# Patient Record
Sex: MALE | Race: OTHER | Employment: OTHER | URBAN - METROPOLITAN AREA
[De-identification: names, ages, dates, MRNs, and addresses within clinical notes are randomized per-mention and may not be internally consistent; named-entity substitution may affect disease eponyms.]

---

## 2017-10-17 ENCOUNTER — OFFICE VISIT (OUTPATIENT)
Dept: FAMILY MEDICINE CLINIC | Facility: CLINIC | Age: 82
End: 2017-10-17

## 2017-10-17 VITALS
TEMPERATURE: 98 F | RESPIRATION RATE: 16 BRPM | SYSTOLIC BLOOD PRESSURE: 110 MMHG | DIASTOLIC BLOOD PRESSURE: 70 MMHG | HEIGHT: 63.5 IN | HEART RATE: 68 BPM | WEIGHT: 141 LBS | BODY MASS INDEX: 24.67 KG/M2

## 2017-10-17 DIAGNOSIS — Z95.0 PACEMAKER: ICD-10-CM

## 2017-10-17 DIAGNOSIS — E53.8 B12 DEFICIENCY: ICD-10-CM

## 2017-10-17 DIAGNOSIS — M15.9 OSTEOARTHRITIS OF MULTIPLE JOINTS, UNSPECIFIED OSTEOARTHRITIS TYPE: ICD-10-CM

## 2017-10-17 DIAGNOSIS — L85.3 DRY SKIN DERMATITIS: ICD-10-CM

## 2017-10-17 DIAGNOSIS — H26.9 CATARACT, UNSPECIFIED CATARACT TYPE, UNSPECIFIED LATERALITY: ICD-10-CM

## 2017-10-17 DIAGNOSIS — I10 ESSENTIAL HYPERTENSION: Primary | ICD-10-CM

## 2017-10-17 DIAGNOSIS — E78.01 FAMILIAL HYPERCHOLESTEROLEMIA: ICD-10-CM

## 2017-10-17 PROCEDURE — 90686 IIV4 VACC NO PRSV 0.5 ML IM: CPT | Performed by: INTERNAL MEDICINE

## 2017-10-17 PROCEDURE — 99214 OFFICE O/P EST MOD 30 MIN: CPT | Performed by: INTERNAL MEDICINE

## 2017-10-17 PROCEDURE — G0008 ADMIN INFLUENZA VIRUS VAC: HCPCS | Performed by: INTERNAL MEDICINE

## 2017-10-17 RX ORDER — ATORVASTATIN CALCIUM 40 MG/1
40 TABLET, FILM COATED ORAL NIGHTLY
COMMUNITY
End: 2018-07-17

## 2017-10-17 RX ORDER — NAPROXEN 375 MG/1
375 TABLET ORAL 2 TIMES DAILY WITH MEALS
COMMUNITY
End: 2018-02-05

## 2017-10-17 RX ORDER — ALFUZOSIN HYDROCHLORIDE 10 MG/1
10 TABLET, EXTENDED RELEASE ORAL DAILY
COMMUNITY
End: 2018-02-06

## 2017-10-17 RX ORDER — LOSARTAN POTASSIUM 100 MG/1
TABLET ORAL DAILY
COMMUNITY
End: 2017-11-30

## 2017-10-17 RX ORDER — HYDROXYZINE HYDROCHLORIDE 25 MG/1
25 TABLET, FILM COATED ORAL DAILY PRN
COMMUNITY

## 2017-10-17 NOTE — PROGRESS NOTES
CC: Patient presents with:  Establish Care: Former PCP was Dr. Cheryle Hocking in [place], [place]. Records Release obtained.   Imm/Inj: Flu vaccine today       HPI:      Here to establish   Over the past year     Here takes makes medication for hyperlipidemia     Ta Alcohol use:  No                  REVIEW OF SYSTEMS:   GENERAL: feels well otherwise, no fevers chills or night sweats   SKIN: denies any unusual skin lesions  EYES:denies blurred vision or double vision  HEENT: denies nasal congestion, sinus pain or ST (primary encounter diagnosis)  -stable controlled   -continue current management    B12 deficiency  -recheck b12 level    Familial hypercholesterolemia  -recheck lipid panel   -continue statin therapy     Osteoarthritis of multiple joints, unspecified oste

## 2017-10-23 ENCOUNTER — LAB ENCOUNTER (OUTPATIENT)
Dept: LAB | Age: 82
End: 2017-10-23
Attending: INTERNAL MEDICINE
Payer: COMMERCIAL

## 2017-10-23 DIAGNOSIS — E53.8 B12 DEFICIENCY: ICD-10-CM

## 2017-10-23 DIAGNOSIS — E78.01 FAMILIAL HYPERCHOLESTEROLEMIA: ICD-10-CM

## 2017-10-23 DIAGNOSIS — I10 ESSENTIAL HYPERTENSION: ICD-10-CM

## 2017-10-23 PROCEDURE — 80061 LIPID PANEL: CPT

## 2017-10-23 PROCEDURE — 36415 COLL VENOUS BLD VENIPUNCTURE: CPT

## 2017-10-23 PROCEDURE — 85025 COMPLETE CBC W/AUTO DIFF WBC: CPT

## 2017-10-23 PROCEDURE — 82607 VITAMIN B-12: CPT

## 2017-10-23 PROCEDURE — 80053 COMPREHEN METABOLIC PANEL: CPT

## 2017-11-07 ENCOUNTER — OFFICE VISIT (OUTPATIENT)
Dept: FAMILY MEDICINE CLINIC | Facility: CLINIC | Age: 82
End: 2017-11-07

## 2017-11-07 ENCOUNTER — TELEPHONE (OUTPATIENT)
Dept: FAMILY MEDICINE CLINIC | Facility: CLINIC | Age: 82
End: 2017-11-07

## 2017-11-07 VITALS
BODY MASS INDEX: 24.41 KG/M2 | HEART RATE: 78 BPM | HEIGHT: 64 IN | RESPIRATION RATE: 16 BRPM | WEIGHT: 143 LBS | TEMPERATURE: 98 F | OXYGEN SATURATION: 98 % | DIASTOLIC BLOOD PRESSURE: 80 MMHG | SYSTOLIC BLOOD PRESSURE: 124 MMHG

## 2017-11-07 DIAGNOSIS — J02.9 ACUTE PHARYNGITIS, UNSPECIFIED ETIOLOGY: Primary | ICD-10-CM

## 2017-11-07 PROCEDURE — 87081 CULTURE SCREEN ONLY: CPT | Performed by: PHYSICIAN ASSISTANT

## 2017-11-07 PROCEDURE — 87880 STREP A ASSAY W/OPTIC: CPT | Performed by: PHYSICIAN ASSISTANT

## 2017-11-07 PROCEDURE — 99213 OFFICE O/P EST LOW 20 MIN: CPT | Performed by: PHYSICIAN ASSISTANT

## 2017-11-07 NOTE — PATIENT INSTRUCTIONS
Pharyngitis (Sore Throat), Report Pending    Pharyngitis (sore throat) is often due to a virus. It can also be caused by the streptococcus, or strep, bacterium, often called strep throat.  Both viral and strep infections can cause throat pain that is wors · For children: Use acetaminophen for fever, fussiness, or discomfort.  In infants older than 10months of age, you may use ibuprofen instead of acetaminophen. Talk with your child's healthcare provider before giving these medicines if your child has chronic · Signs of dehydration (very dark urine or no urine, sunken eyes, dizziness)  · Trouble breathing or noisy breathing  · Muffled voice  · New rash  · Child appears to be getting sicker  Date Last Reviewed: 4/13/2015  © 1848-3518 [employer].  8 Prevent future sore throats  Prevention tips include the following:  · Stop smoking or reduce contact with secondhand smoke. Smoke irritates the tender throat lining. · Limit contact with pets and with allergy-causing substances, such as pollen and mold. The tonsils and pharynx can become inflamed due to a cold or flu virus. Postnasal drip (excess mucus draining from the nasal cavity) can irritate the throat. It can also make the throat or tonsils more likely to be infected by bacteria.  Severe, untreated t Treatment depends on many factors. What is the likely cause? Is the problem recent? Does it keep coming back? In many cases, the best thing to do is to treat the symptoms, rest, and let the problem heal itself.  Antibiotics may help clear up some bacterial In some cases, tonsils need to be removed. This is often done as outpatient (same-day) surgery. Your healthcare provider may advise removing the tonsils in cases of:  · Several severe bouts of tonsillitis in a year.  “Severe” episodes include those that billy

## 2017-11-07 NOTE — PROGRESS NOTES
CHIEF COMPLAINT:   Patient presents with:  Sore Throat    HPI:   Rocio Marroquin is a [de-identified]year old male presents to clinic with complaint of sore throat. Patient has had for 3 days.  Patient/daughter reports following associated symptoms: fever, no nasal c GENERAL: well developed, well nourished, in no apparent distress  SKIN: no rashes, no suspicious lesions  HEAD: atraumatic, normocephalic  EYES: conjunctiva clear, EOM intact  EARS: TM's clear, non-injected, no bulging, retraction, or fluid bilaterally  NO A test has been done to find out whether you (or your child, if your child is the patient) have strep throat. Call this facility or your healthcare provider if you were not given your test results.  If the test is positive for strep infection, you will need · Use throat lozenges or numbing throat sprays to help reduce pain. Gargling with warm salt water will also help reduce throat pain. For this, dissolve 1/2 teaspoon of salt in 1 glass of warm water.  To help soothe a sore throat, children can sip on juice o Sore throats happen for many reasons, such as colds, allergies, and infections caused by viruses or bacteria. In any case, your throat becomes red and sore.  Your goal for self-care is to reduce your discomfort while giving your throat a chance to heal.  Mo · A temperature over 101°F (38.3°C)  · White spots on the throat  · Great difficulty swallowing  · Trouble breathing  · A skin rash  · Recent exposure to someone else with strep bacteria  · Severe hoarseness and swollen glands in the neck or jaw   Date Las · How long has the sore throat lasted and how have you been treating it? · Do you have any other symptoms, such as body aches, fever, or cough? · Does your sore throat recur? If so, how often?  How many days of school or work have you missed because of a Are antibiotics needed? If your sore throat is due to a bacterial infection, antibiotics may speed healing and prevent complications.  Although group A streptococcus (\"strep throat\" or GAS) is the major treatable infection for a sore throat, GAS causes o © 4974-9969 The [employer]. [address] [facility], [facility] [employer]. All rights reserved. This information is not intended as a substitute for professional medical care. Always follow your healthcare professional's instructions.             The

## 2017-11-07 NOTE — TELEPHONE ENCOUNTER
Patient has the following pre op appt:      Future Appointments  Date Time Provider Ross Wynn   11/9/2017 11:30 AM Jacinta Ryder PA-C [facility] [address]   1/10/2018 2:00 PM Iram Vazquez MD [facility] [address]     Pre op form placed in AD pre

## 2017-11-08 NOTE — PROGRESS NOTES
Eye exam from 11/07/17 received from Dr. Renea Patino via fax. Report placed n Dr. Azra Mcguire in box for review.

## 2017-11-09 ENCOUNTER — OFFICE VISIT (OUTPATIENT)
Dept: FAMILY MEDICINE CLINIC | Facility: CLINIC | Age: 82
End: 2017-11-09

## 2017-11-09 VITALS
HEIGHT: 64 IN | RESPIRATION RATE: 16 BRPM | WEIGHT: 143 LBS | HEART RATE: 78 BPM | SYSTOLIC BLOOD PRESSURE: 132 MMHG | BODY MASS INDEX: 24.41 KG/M2 | DIASTOLIC BLOOD PRESSURE: 80 MMHG

## 2017-11-09 DIAGNOSIS — E78.5 HYPERLIPIDEMIA, UNSPECIFIED HYPERLIPIDEMIA TYPE: ICD-10-CM

## 2017-11-09 DIAGNOSIS — I10 ESSENTIAL HYPERTENSION: ICD-10-CM

## 2017-11-09 DIAGNOSIS — Z01.818 PRE-OP EVALUATION: Primary | ICD-10-CM

## 2017-11-09 DIAGNOSIS — H26.9 CATARACT OF BOTH EYES, UNSPECIFIED CATARACT TYPE: ICD-10-CM

## 2017-11-09 DIAGNOSIS — I65.29 STENOSIS OF CAROTID ARTERY, UNSPECIFIED LATERALITY: ICD-10-CM

## 2017-11-09 DIAGNOSIS — Z95.0 PACEMAKER: ICD-10-CM

## 2017-11-09 PROCEDURE — 93000 ELECTROCARDIOGRAM COMPLETE: CPT | Performed by: PHYSICIAN ASSISTANT

## 2017-11-09 PROCEDURE — 99214 OFFICE O/P EST MOD 30 MIN: CPT | Performed by: PHYSICIAN ASSISTANT

## 2017-11-09 NOTE — PATIENT INSTRUCTIONS
Thank you for choosing Juju Valentine PA-C at [place] [address]  To Do: [facility]  1. Pt to follow-up after surgery as directed  Effective 6/19/17 until November 2017  Due to [employer] is being moved.   It is inside the [employer] medication interactions.  It is your duty and for your safety to discuss with the pharmacist and our office with questions, and to notify us and stop treatment if problems arise, but know that our intention is that the benefits outweigh those potential ris acetaminophen/tylenol for pain is ok. Herbals: Ephedra, garlic, ginkgo, ginseng, kava, [employer], and Valerian, should be held 3-5 days prior to surgery.     Cardiovascular agents:   Ace Inhibitors (lisinopril, benazepril, ramipril etc.) or ARBs ( Immune Agents: Talk to your specialist doctor because  Sulfasalazine, azathioprine held 1 week before surgery  Leflunomide is held 2 weeks before surgery  etanercept, infliximab, anakinra, rituximab, adalimumab - held before surgery talk to the doctor abou

## 2017-11-09 NOTE — PROGRESS NOTES
Preoperative History and Physical    CC:  Patient presents with:  Pre-Op Exam: R eye cataract surgery with Dr. Michael Monsalve on 11/30/17      Lulu Cade is [de-identified]year old presenting for a preoperative exam.    This patient is having surgery on date: 11/30/17 Outpatient Prescriptions on File Prior to Visit:  atorvastatin 40 MG Oral Tab Take 40 mg by mouth nightly. Disp:  Rfl:    Alfuzosin HCl ER 10 MG Oral Tablet 24 Hr Take 10 mg by mouth daily.  Disp:  Rfl:    Cyanocobalamin (VITAMIN B 12 OR) Take 1 tablet by m Bowel sounds are normal. He exhibits no distension and no mass. There is no tenderness. There is no rebound and no guarding. Lymphadenopathy:     He has no cervical adenopathy. Neurological: He is alert and oriented to person, place, and time.    Skin:

## 2017-11-14 NOTE — TELEPHONE ENCOUNTER
Faxed letter to Dr. Corinne Shire and Dr. Elise Pérez office request cardiac clearance due to patient's pacemaker. Fax #: [phone].

## 2017-11-16 ENCOUNTER — TELEPHONE (OUTPATIENT)
Dept: FAMILY MEDICINE CLINIC | Facility: CLINIC | Age: 82
End: 2017-11-16

## 2017-11-16 NOTE — TELEPHONE ENCOUNTER
Note received from Nilam Lantigua - Dr. Felipe Chan stating patient is a low cardiac risk for upcoming cataract removal with Dr. Swathi Zheng. Given to Nellie Nissen to review.

## 2017-11-21 ENCOUNTER — TELEPHONE (OUTPATIENT)
Dept: FAMILY MEDICINE CLINIC | Facility: CLINIC | Age: 82
End: 2017-11-21

## 2017-11-21 NOTE — TELEPHONE ENCOUNTER
Pts daughter states that she needs the medical clearance letter from Dr Ai Carranza to be faxed to Dr Marely Magana.     Contacted Dr Catarina Carrion office, [phone], and spoke to Medical Clearance and they will fax us a copy of the cardiac clearance and will also fax

## 2017-11-21 NOTE — TELEPHONE ENCOUNTER
pts daughter called to request a clearance letter from provider he sees for his pace maker was told the request has to come from pcp   pts daughter will call back with provider and telephone number

## 2017-11-30 ENCOUNTER — TELEPHONE (OUTPATIENT)
Dept: FAMILY MEDICINE CLINIC | Facility: CLINIC | Age: 82
End: 2017-11-30

## 2017-11-30 NOTE — TELEPHONE ENCOUNTER
Requesting Losartan 100mg  LOV: 11/9/17  RTC: f/u post surgery  Last Relevant Labs: 10/23/17  Filled: never filled by our office.   Future Appointments  Date Time Provider Ross Wynn   1/11/2018 11:30 AM Tova aFlcon MD [facility] [address]

## 2017-11-30 NOTE — TELEPHONE ENCOUNTER
Dr.Shah- Hartmann is requesting a new rx for Losartan 100 mg Tablets by mouth every day. Once daily. Was written prior by GABRIEL Damian.   Please advise    [address], [address] AT [facility],

## 2017-12-01 RX ORDER — LOSARTAN POTASSIUM 100 MG/1
100 TABLET ORAL DAILY
Qty: 90 TABLET | Refills: 1 | Status: SHIPPED | OUTPATIENT
Start: 2017-12-01 | End: 2018-05-29

## 2017-12-01 NOTE — TELEPHONE ENCOUNTER
Disp Refills Start End    losartan 100 MG Oral Tab 90 tablet 1 12/1/2017     Sig - Route:  Take 1 tablet (100 mg total) by mouth daily. - Oral    E-Prescribing Status: Receipt confirmed by pharmacy (12/1/2017 12:01 PM CST)

## 2018-01-02 ENCOUNTER — MED REC SCAN ONLY (OUTPATIENT)
Dept: FAMILY MEDICINE CLINIC | Facility: CLINIC | Age: 83
End: 2018-01-02

## 2018-01-06 ENCOUNTER — OFFICE VISIT (OUTPATIENT)
Dept: FAMILY MEDICINE CLINIC | Facility: CLINIC | Age: 83
End: 2018-01-06

## 2018-01-06 VITALS
TEMPERATURE: 98 F | DIASTOLIC BLOOD PRESSURE: 70 MMHG | BODY MASS INDEX: 25.1 KG/M2 | WEIGHT: 147 LBS | SYSTOLIC BLOOD PRESSURE: 126 MMHG | HEART RATE: 74 BPM | HEIGHT: 64 IN | RESPIRATION RATE: 16 BRPM

## 2018-01-06 DIAGNOSIS — I65.29 STENOSIS OF CAROTID ARTERY, UNSPECIFIED LATERALITY: ICD-10-CM

## 2018-01-06 DIAGNOSIS — M19.90 ARTHRITIS: ICD-10-CM

## 2018-01-06 DIAGNOSIS — Z95.0 PACEMAKER: ICD-10-CM

## 2018-01-06 DIAGNOSIS — E78.5 HYPERLIPIDEMIA, UNSPECIFIED HYPERLIPIDEMIA TYPE: ICD-10-CM

## 2018-01-06 DIAGNOSIS — I10 ESSENTIAL HYPERTENSION: Primary | ICD-10-CM

## 2018-01-06 PROBLEM — E53.8 B12 DEFICIENCY: Status: ACTIVE | Noted: 2018-01-06

## 2018-01-06 PROBLEM — N40.0 BENIGN PROSTATIC HYPERPLASIA WITHOUT LOWER URINARY TRACT SYMPTOMS: Status: ACTIVE | Noted: 2018-01-06

## 2018-01-06 PROCEDURE — 99214 OFFICE O/P EST MOD 30 MIN: CPT | Performed by: FAMILY MEDICINE

## 2018-01-06 NOTE — PATIENT INSTRUCTIONS
Thank you for choosing Lyly Abraham MD at [place] [address]  To Do: [facility]  1. Please take meds as directed  Effective 6/19/17 until November 2017  Due to [employer] is being moved.   It is inside the [facility] is your duty and for your safety to discuss with the pharmacist and our office with questions, and to notify us and stop treatment if problems arise, but know that our intention is that the benefits outweigh those potential risks and we strive to make you

## 2018-01-06 NOTE — PROGRESS NOTES
HPI:    Patient ID: Carmita Hartman is a [de-identified]year old male. HPI  Mr. Bandar Ventura is a pleasant [de-identified] y/o M who is here to establish care with me. He is accompanied by his daughter who had help interpret for us.   He has history of hypertension, hyperlipidemia, b with meals. Disp:  Rfl:      Allergies:No Known Allergies   PHYSICAL EXAM:   Physical Exam   Constitutional: No distress. HENT:   Right Ear: External ear normal.   Left Ear: External ear normal.   Mouth/Throat: No oropharyngeal exudate.    Eyes: Conjuncti

## 2018-01-10 ENCOUNTER — TELEPHONE (OUTPATIENT)
Dept: FAMILY MEDICINE CLINIC | Facility: CLINIC | Age: 83
End: 2018-01-10

## 2018-01-10 DIAGNOSIS — Z95.0 PACEMAKER: Primary | ICD-10-CM

## 2018-01-10 NOTE — TELEPHONE ENCOUNTER
I think Dr. Rodger Carmen is EP Cardiology and had see pt before.  Agree with also seeing Dr. Matty Mccoy

## 2018-01-10 NOTE — TELEPHONE ENCOUNTER
Patients daughter would like to have Dr Becca Raymundo as pts primary  Cardiologist, daughter is requesting a referral.    Attempted to input referral but Dr Becca Raymundo is not showing up as an option, would you like to refer to Dr Codi Caal?   Referral pended

## 2018-01-10 NOTE — TELEPHONE ENCOUNTER
Pt was given referral to see Dr Segal/Cardiology, he is not accepting new patients. Pts daughter wants to know who Dr would like pt to see?  Will need new referral.

## 2018-01-10 NOTE — TELEPHONE ENCOUNTER
I see a referral to Dr. Sha Nolasco as well for the pacemaker? Is that who you want patient to see since Dr. Salina Weber is no longer accepting new patients?

## 2018-01-11 ENCOUNTER — HOSPITAL ENCOUNTER (OUTPATIENT)
Dept: ULTRASOUND IMAGING | Age: 83
Discharge: HOME | End: 2018-01-11
Attending: FAMILY MEDICINE
Payer: MEDICARE

## 2018-01-11 DIAGNOSIS — I65.29 STENOSIS OF CAROTID ARTERY, UNSPECIFIED LATERALITY: ICD-10-CM

## 2018-01-11 PROCEDURE — 93880 EXTRACRANIAL BILAT STUDY: CPT | Performed by: FAMILY MEDICINE

## 2018-01-12 DIAGNOSIS — I65.21 CAROTID STENOSIS, RIGHT: Primary | ICD-10-CM

## 2018-01-15 ENCOUNTER — TELEPHONE (OUTPATIENT)
Dept: FAMILY MEDICINE CLINIC | Facility: CLINIC | Age: 83
End: 2018-01-15

## 2018-01-15 ENCOUNTER — HOSPITAL ENCOUNTER (OUTPATIENT)
Dept: CT IMAGING | Age: 83
Discharge: HOME | End: 2018-01-15
Attending: FAMILY MEDICINE
Payer: MEDICARE

## 2018-01-15 DIAGNOSIS — I65.21 STENOSIS OF RIGHT CAROTID ARTERY: Primary | ICD-10-CM

## 2018-01-15 DIAGNOSIS — I65.21 CAROTID STENOSIS, RIGHT: ICD-10-CM

## 2018-01-15 LAB — CREAT SERPL-MCNC: 0.9 MG/DL (ref 0.7–1.3)

## 2018-01-15 PROCEDURE — 82565 ASSAY OF CREATININE: CPT

## 2018-01-15 PROCEDURE — 70498 CT ANGIOGRAPHY NECK: CPT | Performed by: FAMILY MEDICINE

## 2018-01-15 NOTE — TELEPHONE ENCOUNTER
Please refer Mr. Xiong to vascular surgery for high-grade stenosis noted on the right carotid bulb and right cervical internal carotid artery.

## 2018-01-15 NOTE — TELEPHONE ENCOUNTER
Per radiology study was completed and there are significant finding, requesting MD to review asap    Study Result     PROCEDURE:  CTA CAROTID ARTERIES (CPT=70498)     COMPARISON:  [place], US CAROTID DOPPLER BILAT - DIA IM (CPT=93880), 1/11/2018, 10:5 cervical internal carotid artery that likely results in greater than 95% stenosis by NASCET criteria and decreased downstream caliber of the   right cervical internal carotid artery there remains patent.      There is minimal scattered mixed plaque in the l

## 2018-01-15 NOTE — TELEPHONE ENCOUNTER
Patient's daughter informed of need to see vascular surgeon. Referral placed for Dr. Nitza Baer.      [address] [facility]  [place], [address]   Phone: [phone]     Time: 5 min

## 2018-01-23 PROBLEM — I65.22: Status: ACTIVE | Noted: 2018-01-23

## 2018-02-05 RX ORDER — NAPROXEN 375 MG/1
375 TABLET ORAL 2 TIMES DAILY WITH MEALS
Qty: 180 TABLET | Refills: 1 | Status: SHIPPED | OUTPATIENT
Start: 2018-02-05 | End: 2019-02-17

## 2018-02-05 NOTE — TELEPHONE ENCOUNTER
Pt needs refill on ,  Please advise  naproxen 375 MG Oral Tab       Sig :  Take 375 mg by mouth 2 (two) times daily with meals.      Route:   Oral     Class:   Historical     Order #: [id number]

## 2018-02-05 NOTE — TELEPHONE ENCOUNTER
Requesting naproxen 375mg  LOV: 1/6/18  RTC: 6 months  Last Relevant Labs: 10/23/17      Future Appointments  Date Time Provider Ross Wynn   2/15/2018 2:00 PM Scotty Leon MD [facility]   5/23/2018 2:45 PM Lou Ortiz

## 2018-02-06 RX ORDER — ALFUZOSIN HYDROCHLORIDE 10 MG/1
TABLET, EXTENDED RELEASE ORAL
Qty: 90 TABLET | Refills: 1 | Status: SHIPPED | OUTPATIENT
Start: 2018-02-06 | End: 2018-08-18

## 2018-02-06 NOTE — TELEPHONE ENCOUNTER
Requesting alfuzosin er 10mg  LOV: 1/6/18  RTC: 6 months  Last Relevant Labs: 10/23/17      Future Appointments  Date Time Provider Ross Wynn   2/15/2018 2:00 PM Rubi Leon MD [facility]   5/23/2018 2:45 PM Ronda Ortiz

## 2018-05-26 ENCOUNTER — HOSPITAL ENCOUNTER (EMERGENCY)
Age: 83
Discharge: HOME | End: 2018-05-27
Attending: EMERGENCY MEDICINE
Payer: MEDICARE

## 2018-05-26 DIAGNOSIS — R53.81 MALAISE: Primary | ICD-10-CM

## 2018-05-26 PROCEDURE — 36415 COLL VENOUS BLD VENIPUNCTURE: CPT

## 2018-05-26 PROCEDURE — 85652 RBC SED RATE AUTOMATED: CPT | Performed by: EMERGENCY MEDICINE

## 2018-05-26 PROCEDURE — 93010 ELECTROCARDIOGRAM REPORT: CPT

## 2018-05-26 PROCEDURE — 85025 COMPLETE CBC W/AUTO DIFF WBC: CPT | Performed by: EMERGENCY MEDICINE

## 2018-05-26 PROCEDURE — 99285 EMERGENCY DEPT VISIT HI MDM: CPT

## 2018-05-26 PROCEDURE — 93005 ELECTROCARDIOGRAM TRACING: CPT

## 2018-05-26 PROCEDURE — 80053 COMPREHEN METABOLIC PANEL: CPT | Performed by: EMERGENCY MEDICINE

## 2018-05-27 ENCOUNTER — APPOINTMENT (OUTPATIENT)
Dept: GENERAL RADIOLOGY | Age: 83
End: 2018-05-27
Attending: EMERGENCY MEDICINE
Payer: MEDICARE

## 2018-05-27 ENCOUNTER — APPOINTMENT (OUTPATIENT)
Dept: CT IMAGING | Age: 83
End: 2018-05-27
Attending: EMERGENCY MEDICINE
Payer: MEDICARE

## 2018-05-27 VITALS
BODY MASS INDEX: 25 KG/M2 | WEIGHT: 144 LBS | HEART RATE: 75 BPM | DIASTOLIC BLOOD PRESSURE: 90 MMHG | OXYGEN SATURATION: 96 % | TEMPERATURE: 98 F | RESPIRATION RATE: 16 BRPM | SYSTOLIC BLOOD PRESSURE: 157 MMHG

## 2018-05-27 PROCEDURE — 71046 X-RAY EXAM CHEST 2 VIEWS: CPT | Performed by: EMERGENCY MEDICINE

## 2018-05-27 PROCEDURE — 81003 URINALYSIS AUTO W/O SCOPE: CPT | Performed by: EMERGENCY MEDICINE

## 2018-05-27 PROCEDURE — 70498 CT ANGIOGRAPHY NECK: CPT | Performed by: EMERGENCY MEDICINE

## 2018-05-27 NOTE — ED PROVIDER NOTES
Patient Seen in: [facility] [place]    History   Patient presents with:  Fatigue (constitutional, neurologic)    Stated Complaint: \"feels tired\"     HPI    Patient brought for evaluation complaining that he is having loss of ener rash.  Turgor normal  HEENT: Pupils equal round reactive to light. EOMI. No nystagmus. Tympanic membranes are throat normal.  Oropharynx moist.  Neck: Supple without adenopathy. Carotid arteries normal on the left. Bounding on the right.   Thyroid not Etiology of the symptoms unclear at this point. Can be discharged home to observe symptoms. Follow-up with family doctor later this week. Return emergency department further problems occur.           Disposition and Plan     Clinical Impression:  Malaise

## 2018-05-27 NOTE — ED INITIAL ASSESSMENT (HPI)
Pt here with family who states pt is more tired than usual since yesterday and noticed pt is more confused/forgetful since yesterday. Pt has had loss of appetite over the past couple days. Denies headache, dizziness, visual changes or unilateral weakness.

## 2018-05-29 RX ORDER — LOSARTAN POTASSIUM 100 MG/1
100 TABLET ORAL DAILY
Qty: 90 TABLET | Refills: 0 | Status: SHIPPED | OUTPATIENT
Start: 2018-05-29 | End: 2018-08-31

## 2018-05-29 NOTE — TELEPHONE ENCOUNTER
Requesting: Losartan 100mg  LOV: 18  RTC: 6 months  Last Relevant Labs: 18 - Dr. Parks Coppin17 #90 with 1 refills    Future Appointments  Date Time Provider Ross Wynn   2018 4:30 PM Grandville Baumgarten, MD [facility] [address]

## 2018-05-29 NOTE — TELEPHONE ENCOUNTER
REcd refill request from [facility] in [place] for Losartan 100 mg for quantity of 90. Unable to escribe due to original prescriber name.

## 2018-06-05 ENCOUNTER — MA CHART PREP (OUTPATIENT)
Dept: FAMILY MEDICINE CLINIC | Facility: CLINIC | Age: 83
End: 2018-06-05

## 2018-06-06 ENCOUNTER — OFFICE VISIT (OUTPATIENT)
Dept: FAMILY MEDICINE CLINIC | Facility: CLINIC | Age: 83
End: 2018-06-06

## 2018-06-06 VITALS
HEIGHT: 64 IN | TEMPERATURE: 99 F | DIASTOLIC BLOOD PRESSURE: 72 MMHG | SYSTOLIC BLOOD PRESSURE: 128 MMHG | HEART RATE: 70 BPM | BODY MASS INDEX: 24.24 KG/M2 | RESPIRATION RATE: 16 BRPM | WEIGHT: 142 LBS

## 2018-06-06 DIAGNOSIS — Z95.0 PACEMAKER: ICD-10-CM

## 2018-06-06 DIAGNOSIS — N40.0 BENIGN PROSTATIC HYPERPLASIA WITHOUT LOWER URINARY TRACT SYMPTOMS: ICD-10-CM

## 2018-06-06 DIAGNOSIS — Z00.00 WELLNESS EXAMINATION: Primary | ICD-10-CM

## 2018-06-06 DIAGNOSIS — E53.8 B12 DEFICIENCY: ICD-10-CM

## 2018-06-06 DIAGNOSIS — I10 ESSENTIAL HYPERTENSION: ICD-10-CM

## 2018-06-06 DIAGNOSIS — I65.22 MILD ATHEROSCLEROSIS OF CAROTID ARTERY, LEFT: ICD-10-CM

## 2018-06-06 DIAGNOSIS — M19.90 ARTHRITIS: ICD-10-CM

## 2018-06-06 DIAGNOSIS — I65.29 STENOSIS OF CAROTID ARTERY, UNSPECIFIED LATERALITY: ICD-10-CM

## 2018-06-06 DIAGNOSIS — Z00.00 ENCOUNTER FOR ANNUAL HEALTH EXAMINATION: ICD-10-CM

## 2018-06-06 DIAGNOSIS — E78.2 MIXED HYPERLIPIDEMIA: ICD-10-CM

## 2018-06-06 PROCEDURE — G0439 PPPS, SUBSEQ VISIT: HCPCS | Performed by: FAMILY MEDICINE

## 2018-06-06 PROCEDURE — 96160 PT-FOCUSED HLTH RISK ASSMT: CPT | Performed by: FAMILY MEDICINE

## 2018-06-06 RX ORDER — TRAMADOL HYDROCHLORIDE 50 MG/1
50 TABLET ORAL 2 TIMES DAILY PRN
Qty: 60 TABLET | Refills: 1 | Status: SHIPPED | OUTPATIENT
Start: 2018-06-06 | End: 2018-07-24

## 2018-06-06 NOTE — PATIENT INSTRUCTIONS
Thank you for choosing George Hollins MD at [place] [address]  To Do: [facility]  1. Please see age appropriate health prevention below    edo is located in [address]. Monday, Tuesday & Friday – 8 a.m. to 4 p.m.   Wednesday, Thurs • Please call our office about any questions regarding your treatment/medicines/tests as a result of today's visit.  For your safety, read the entire package insert of all medicines prescribed to you and be aware of all of the risks of treatment even beyon Screening tests and vaccines are an important part of managing your health. Health counseling is essential, too. Below are guidelines for these, for men ages 72 and older. Talk with your healthcare provider to make sure you’re up-to-date on what you need. Prostate cancer All men in this age group, talk to healthcare provider about risks and benefits of digital rectal exam (TRIPP) and prostate-specific antigen (PSA) screening1 At routine exams   Syphilis Men at increased risk for infection – talk with your hea Fall prevention (exercise, vitamin D supplements) All men in this age group At routine exams   Sexually transmitted infection Men at increased risk for infection – talk with your healthcare provider At routine exams   Use of daily aspirin Men ages 39 to 78 Cholesterol, Total (mg/dL)   Date Value   10/23/2017 102     Triglycerides (mg/dL)   Date Value   10/23/2017 84        EKG - covered if needed at Welcome to Medicare, and non-screening if indicated for medical reasons    Electrocardiogram date05/26/2018 Ro Please get every year    Pneumococcal 13 (Prevnar)  Covered Once after 65 No orders found for this or any previous visit.  Please get once after your 65th birthday    Pneumococcal 23 (Pneumovax)  Covered Once after 65 No orders found for this or any previo

## 2018-06-06 NOTE — PROGRESS NOTES
HPI:   Nikki Avila is a 82year old male who presents for a MA (Medicare Advantage) [address] [facility] (Once per calendar year). Mr. Robert Vickers is a pleasant 82-year-old male who is here with her daughter to help translate.   He has history of hypertension, hype Directive:   He does have a Living Will but we do NOT have it on file in [employer]. Not Discussed         He does have a POA but we do NOT have it on file in [employer]. Not Discussed           He smoked tobacco in the past but quit greater than 12 months ago.   Vega Damon needed for Pain.   losartan 100 MG Oral Tab Take 1 tablet (100 mg total) by mouth daily.    ALFUZOSIN HCL ER 10 MG Oral Tablet 24 Hr TAKE 1 TABLET(10 MG) BY MOUTH EVERY DAY   naproxen 375 MG Oral Tab Take 1 tablet (375 mg total) by mouth 2 (two) times daily (Required for AWV/SWV)    Whispered Voice        Visual Acuity                           Physical Exam   Vitals reviewed. Constitutional: He is oriented to person, place, and time. He appears well-developed. No distress.    HENT:   Right Ear: Ear canal no symptoms-stable; CPM      B12 deficiency-stable; CPM      Mild atherosclerosis of carotid artery, left-stable; CPM      Other orders  -     TraMADol HCl 50 MG Oral Tab; Take 1 tablet (50 mg total) by mouth 2 (two) times daily as needed for Pain.          Tejas Cleary Blood Annually No results found for: FOB No flowsheet data found. Glaucoma Screening      Ophthalmology Visit Annually: Diabetics, FHx Glaucoma, AA>50, > 65 No flowsheet data found.     Prostate Cancer Screening      PSA  Annually There are no pr

## 2018-07-26 PROBLEM — I49.5 SSS (SICK SINUS SYNDROME) (HCC): Status: ACTIVE | Noted: 2018-07-26

## 2018-08-16 RX ORDER — MELATONIN
1000 DAILY
Qty: 90 TABLET | Refills: 1 | Status: SHIPPED | OUTPATIENT
Start: 2018-08-16 | End: 2019-02-08

## 2018-08-16 NOTE — TELEPHONE ENCOUNTER
Requesting Vitamin B12 1000 mcg  LOV: 6/6/18  RTC: 6 months  Last Relevant Labs: 10/23/17  Ref Range & Units 10/23/17  8:11 AM    Vitamin B12 193 - 986 pg/mL 790        Filled: never given a prescription by us.     Future Appointments  Date Time Provider Juan R Hernandez

## 2018-08-20 RX ORDER — ALFUZOSIN HYDROCHLORIDE 10 MG/1
TABLET, EXTENDED RELEASE ORAL
Qty: 90 TABLET | Refills: 1 | Status: SHIPPED | OUTPATIENT
Start: 2018-08-20 | End: 2019-02-14

## 2018-08-20 NOTE — TELEPHONE ENCOUNTER
Requesting Alfuzosin   LOV: 6/6/18  RTC: 6 months   Last Relevant Labs: n/a  Filled: 2/6/18 #90 with 1 refills    Future Appointments  Date Time Provider Ross Wynn   12/3/2018 7:30 AM Precious Mejia MD [facility] [address]   1/25/2019 3:30 PM Sunny Gilmore

## 2018-08-31 ENCOUNTER — LAB ENCOUNTER (OUTPATIENT)
Dept: LAB | Age: 83
End: 2018-08-31
Attending: INTERNAL MEDICINE
Payer: MEDICARE

## 2018-08-31 DIAGNOSIS — E78.2 MIXED HYPERLIPIDEMIA: ICD-10-CM

## 2018-08-31 LAB
CHOLEST SMN-MCNC: 91 MG/DL (ref ?–200)
HDLC SERPL-MCNC: 45 MG/DL (ref 40–59)
LDLC SERPL CALC-MCNC: 34 MG/DL (ref ?–100)
NONHDLC SERPL-MCNC: 46 MG/DL (ref ?–130)
TRIGL SERPL-MCNC: 58 MG/DL (ref 30–149)
VLDLC SERPL CALC-MCNC: 12 MG/DL (ref 0–30)

## 2018-08-31 PROCEDURE — 80061 LIPID PANEL: CPT

## 2018-08-31 PROCEDURE — 36415 COLL VENOUS BLD VENIPUNCTURE: CPT

## 2018-09-04 RX ORDER — LOSARTAN POTASSIUM 100 MG/1
TABLET ORAL
Qty: 90 TABLET | Refills: 1 | Status: SHIPPED | OUTPATIENT
Start: 2018-09-04 | End: 2019-02-27

## 2018-09-04 NOTE — TELEPHONE ENCOUNTER
Hypertension Medications Protocol Passed8/31 3:24 PM   CMP or BMP in past 12 months    Last serum creatinine< 2.0    Appointment in past 6 or next 3 months     Requesting losartan 100mg  LOV: 6/6/18  RTC: 6 months  Last Relevant Labs: 5/26/18  Filled: 5/29

## 2018-11-19 NOTE — TELEPHONE ENCOUNTER
Spoke to pt daughter and provided information to card MD below, daughter agreed an will call to make an appt    Minesh Avalos MD  Cardiovascular Disease, Interventional Cardiology  [address] [facility],[address], [address]  Phone: No

## 2018-12-10 ENCOUNTER — OFFICE VISIT (OUTPATIENT)
Dept: FAMILY MEDICINE CLINIC | Facility: CLINIC | Age: 83
End: 2018-12-10

## 2018-12-10 VITALS
WEIGHT: 144 LBS | DIASTOLIC BLOOD PRESSURE: 70 MMHG | SYSTOLIC BLOOD PRESSURE: 110 MMHG | RESPIRATION RATE: 16 BRPM | HEIGHT: 64 IN | HEART RATE: 70 BPM | BODY MASS INDEX: 24.59 KG/M2 | TEMPERATURE: 98 F

## 2018-12-10 DIAGNOSIS — Z23 NEED FOR INFLUENZA VACCINATION: Primary | ICD-10-CM

## 2018-12-10 DIAGNOSIS — Z95.0 PACEMAKER: ICD-10-CM

## 2018-12-10 DIAGNOSIS — L98.9 HAND LESION: ICD-10-CM

## 2018-12-10 DIAGNOSIS — I10 ESSENTIAL HYPERTENSION: ICD-10-CM

## 2018-12-10 DIAGNOSIS — E78.2 MIXED HYPERLIPIDEMIA: ICD-10-CM

## 2018-12-10 PROCEDURE — 90653 IIV ADJUVANT VACCINE IM: CPT | Performed by: FAMILY MEDICINE

## 2018-12-10 PROCEDURE — 90670 PCV13 VACCINE IM: CPT | Performed by: FAMILY MEDICINE

## 2018-12-10 PROCEDURE — 99214 OFFICE O/P EST MOD 30 MIN: CPT | Performed by: FAMILY MEDICINE

## 2018-12-10 PROCEDURE — G0008 ADMIN INFLUENZA VIRUS VAC: HCPCS | Performed by: FAMILY MEDICINE

## 2018-12-10 PROCEDURE — G0009 ADMIN PNEUMOCOCCAL VACCINE: HCPCS | Performed by: FAMILY MEDICINE

## 2018-12-10 NOTE — PROGRESS NOTES
HPI:    Patient ID: Alan Foster is a 82year old male. HPI  Mr. Marlin Evans is a pleasant 29-year-old male with history of hypertension, hyperlipidemia, BPH, carotid artery disease ,status post pacemaker placement accompanied by his son and daughter-in-l as needed. Disp:  Rfl:    aspirin 81 MG Oral Tab Take 81 mg by mouth daily. Disp:  Rfl:      Allergies:No Known Allergies   PHYSICAL EXAM:   Physical Exam   Constitutional: No distress.    HENT:   Mouth/Throat: Oropharynx is clear and moist. No oropharyng   CARDIO - INTERNAL  ORTHOPEDIC - INTERNAL       MI#[id number]

## 2018-12-10 NOTE — PATIENT INSTRUCTIONS
Thank you for choosing Danica Barboza MD at [place] [address]  To Do: [facility]  1. Please take meds as directed. [facility] is located in [address]. Monday, Tuesday & Friday – 8 a.m. to 4 p.m.   Wednesday, Thursday – 7 a.m. to 3 p.m those potential risks and we strive to make you healthier and to improve your quality of life.     Referrals, and Radiology Information:    If your insurance requires a referral to a specialist, please allow 5 business days to process your referral request.

## 2018-12-16 ENCOUNTER — APPOINTMENT (OUTPATIENT)
Dept: GENERAL RADIOLOGY | Age: 83
End: 2018-12-16
Attending: EMERGENCY MEDICINE
Payer: MEDICARE

## 2018-12-16 ENCOUNTER — HOSPITAL ENCOUNTER (EMERGENCY)
Age: 83
Discharge: HOME | End: 2018-12-16
Attending: EMERGENCY MEDICINE
Payer: MEDICARE

## 2018-12-16 VITALS
WEIGHT: 141 LBS | DIASTOLIC BLOOD PRESSURE: 61 MMHG | BODY MASS INDEX: 24.07 KG/M2 | HEART RATE: 75 BPM | OXYGEN SATURATION: 97 % | TEMPERATURE: 98 F | RESPIRATION RATE: 18 BRPM | HEIGHT: 64 IN | SYSTOLIC BLOOD PRESSURE: 121 MMHG

## 2018-12-16 DIAGNOSIS — R07.89 CHEST PAIN, ATYPICAL: Primary | ICD-10-CM

## 2018-12-16 PROCEDURE — 85025 COMPLETE CBC W/AUTO DIFF WBC: CPT | Performed by: EMERGENCY MEDICINE

## 2018-12-16 PROCEDURE — 99285 EMERGENCY DEPT VISIT HI MDM: CPT

## 2018-12-16 PROCEDURE — 36415 COLL VENOUS BLD VENIPUNCTURE: CPT

## 2018-12-16 PROCEDURE — 93005 ELECTROCARDIOGRAM TRACING: CPT

## 2018-12-16 PROCEDURE — 80053 COMPREHEN METABOLIC PANEL: CPT | Performed by: EMERGENCY MEDICINE

## 2018-12-16 PROCEDURE — 71046 X-RAY EXAM CHEST 2 VIEWS: CPT | Performed by: EMERGENCY MEDICINE

## 2018-12-16 PROCEDURE — 84484 ASSAY OF TROPONIN QUANT: CPT | Performed by: EMERGENCY MEDICINE

## 2018-12-16 PROCEDURE — 93010 ELECTROCARDIOGRAM REPORT: CPT

## 2018-12-16 NOTE — ED PROVIDER NOTES
Patient Seen in: [facility] In [place]    History   Patient presents with:  Chest Pain Angina (cardiovascular)    Stated Complaint: chest pain x 4 days, has pacemaker    HPI    Patient has a history of high blood pressure, high choles Temp 98.4 °F (36.9 °C)   Temp src Temporal   SpO2 98 %   O2 Device None (Room air)       Current:/73   Pulse 74   Temp 98.4 °F (36.9 °C) (Temporal)   Resp 16   Ht 162.6 cm (5' 4\")   Wt 64 kg   SpO2 98%   BMI 24.20 kg/m²         Physical Exam  Gene Abnormal            Final result                 Please view results for these tests on the individual orders.    RAINBOW DRAW BLUE   RAINBOW DRAW LAVENDER   RAINBOW DRAW LIGHT GREEN   RAINBOW DRAW GOLD     EKG    Rate, intervals and axes as noted

## 2019-01-01 ENCOUNTER — TELEPHONE (OUTPATIENT)
Dept: FAMILY MEDICINE CLINIC | Facility: CLINIC | Age: 84
End: 2019-01-01

## 2019-01-01 ENCOUNTER — HOSPITAL ENCOUNTER (EMERGENCY)
Age: 84
Discharge: HOME | End: 2019-01-01
Attending: EMERGENCY MEDICINE
Payer: MEDICARE

## 2019-01-01 ENCOUNTER — OFFICE VISIT (OUTPATIENT)
Dept: FAMILY MEDICINE CLINIC | Facility: CLINIC | Age: 84
End: 2019-01-01
Payer: MEDICARE

## 2019-01-01 ENCOUNTER — APPOINTMENT (OUTPATIENT)
Dept: CT IMAGING | Age: 84
End: 2019-01-01
Attending: EMERGENCY MEDICINE
Payer: MEDICARE

## 2019-01-01 ENCOUNTER — APPOINTMENT (OUTPATIENT)
Dept: GENERAL RADIOLOGY | Age: 84
End: 2019-01-01
Attending: EMERGENCY MEDICINE
Payer: MEDICARE

## 2019-01-01 ENCOUNTER — LAB ENCOUNTER (OUTPATIENT)
Dept: LAB | Age: 84
End: 2019-01-01
Attending: FAMILY MEDICINE
Payer: MEDICARE

## 2019-01-01 VITALS
OXYGEN SATURATION: 95 % | RESPIRATION RATE: 17 BRPM | HEART RATE: 75 BPM | WEIGHT: 141 LBS | DIASTOLIC BLOOD PRESSURE: 85 MMHG | BODY MASS INDEX: 23.49 KG/M2 | TEMPERATURE: 98 F | SYSTOLIC BLOOD PRESSURE: 137 MMHG | HEIGHT: 65 IN

## 2019-01-01 VITALS
HEART RATE: 80 BPM | TEMPERATURE: 98 F | RESPIRATION RATE: 16 BRPM | HEIGHT: 64 IN | DIASTOLIC BLOOD PRESSURE: 70 MMHG | SYSTOLIC BLOOD PRESSURE: 122 MMHG | BODY MASS INDEX: 23.73 KG/M2 | WEIGHT: 139 LBS

## 2019-01-01 VITALS
BODY MASS INDEX: 24.24 KG/M2 | WEIGHT: 142 LBS | HEART RATE: 72 BPM | RESPIRATION RATE: 14 BRPM | SYSTOLIC BLOOD PRESSURE: 120 MMHG | DIASTOLIC BLOOD PRESSURE: 54 MMHG | HEIGHT: 64 IN | TEMPERATURE: 98 F

## 2019-01-01 VITALS
RESPIRATION RATE: 16 BRPM | HEART RATE: 73 BPM | DIASTOLIC BLOOD PRESSURE: 55 MMHG | HEIGHT: 65 IN | BODY MASS INDEX: 22.49 KG/M2 | WEIGHT: 135 LBS | TEMPERATURE: 99 F | OXYGEN SATURATION: 97 % | SYSTOLIC BLOOD PRESSURE: 113 MMHG

## 2019-01-01 VITALS
RESPIRATION RATE: 16 BRPM | OXYGEN SATURATION: 97 % | TEMPERATURE: 99 F | HEIGHT: 65 IN | DIASTOLIC BLOOD PRESSURE: 78 MMHG | HEART RATE: 80 BPM | SYSTOLIC BLOOD PRESSURE: 139 MMHG | BODY MASS INDEX: 24.16 KG/M2 | WEIGHT: 145 LBS

## 2019-01-01 VITALS
RESPIRATION RATE: 16 BRPM | DIASTOLIC BLOOD PRESSURE: 90 MMHG | HEIGHT: 64 IN | SYSTOLIC BLOOD PRESSURE: 130 MMHG | BODY MASS INDEX: 23.9 KG/M2 | WEIGHT: 140 LBS | HEART RATE: 78 BPM | TEMPERATURE: 98 F

## 2019-01-01 DIAGNOSIS — I65.21 STENOSIS OF RIGHT CAROTID ARTERY: ICD-10-CM

## 2019-01-01 DIAGNOSIS — R33.9 URINARY RETENTION: Primary | ICD-10-CM

## 2019-01-01 DIAGNOSIS — I10 ESSENTIAL HYPERTENSION: ICD-10-CM

## 2019-01-01 DIAGNOSIS — R35.0 URINARY FREQUENCY: ICD-10-CM

## 2019-01-01 DIAGNOSIS — R35.0 BENIGN PROSTATIC HYPERPLASIA WITH URINARY FREQUENCY: Primary | ICD-10-CM

## 2019-01-01 DIAGNOSIS — M19.90 ARTHRITIS: ICD-10-CM

## 2019-01-01 DIAGNOSIS — Z23 NEED FOR VACCINATION: Primary | ICD-10-CM

## 2019-01-01 DIAGNOSIS — N40.1 BENIGN PROSTATIC HYPERPLASIA WITH URINARY FREQUENCY: Primary | ICD-10-CM

## 2019-01-01 DIAGNOSIS — I65.22 MILD ATHEROSCLEROSIS OF CAROTID ARTERY, LEFT: ICD-10-CM

## 2019-01-01 DIAGNOSIS — R35.0 FREQUENT URINATION: Primary | ICD-10-CM

## 2019-01-01 DIAGNOSIS — E53.8 B12 DEFICIENCY: ICD-10-CM

## 2019-01-01 DIAGNOSIS — N39.0 URINARY TRACT INFECTION WITHOUT HEMATURIA, SITE UNSPECIFIED: ICD-10-CM

## 2019-01-01 DIAGNOSIS — M67.432 GANGLION CYST OF VOLAR ASPECT OF LEFT WRIST: ICD-10-CM

## 2019-01-01 DIAGNOSIS — Z00.00 ENCOUNTER FOR MEDICARE ANNUAL WELLNESS EXAM: Primary | ICD-10-CM

## 2019-01-01 DIAGNOSIS — I49.5 SSS (SICK SINUS SYNDROME) (HCC): ICD-10-CM

## 2019-01-01 DIAGNOSIS — Z95.0 PACEMAKER: ICD-10-CM

## 2019-01-01 DIAGNOSIS — Z72.820 SLEEP DEPRIVATION: ICD-10-CM

## 2019-01-01 DIAGNOSIS — N13.9 ACUTE URINARY OBSTRUCTION: ICD-10-CM

## 2019-01-01 DIAGNOSIS — E78.2 MIXED HYPERLIPIDEMIA: ICD-10-CM

## 2019-01-01 DIAGNOSIS — Z00.00 ENCOUNTER FOR ANNUAL HEALTH EXAMINATION: ICD-10-CM

## 2019-01-01 DIAGNOSIS — N40.0 BENIGN PROSTATIC HYPERPLASIA WITHOUT LOWER URINARY TRACT SYMPTOMS: ICD-10-CM

## 2019-01-01 DIAGNOSIS — R00.2 PALPITATIONS: Primary | ICD-10-CM

## 2019-01-01 LAB
ALBUMIN SERPL-MCNC: 3.8 G/DL (ref 3.4–5)
ALBUMIN/GLOB SERPL: 1.2 {RATIO} (ref 1–2)
ALP LIVER SERPL-CCNC: 93 U/L (ref 45–117)
ALT SERPL-CCNC: 18 U/L (ref 16–61)
ANION GAP SERPL CALC-SCNC: 9 MMOL/L (ref 0–18)
AST SERPL-CCNC: 10 U/L (ref 15–37)
ATRIAL RATE: 75 BPM
BASOPHILS # BLD AUTO: 0.03 X10(3) UL (ref 0–0.2)
BASOPHILS NFR BLD AUTO: 0.6 %
BILIRUB SERPL-MCNC: 0.8 MG/DL (ref 0.1–2)
BUN BLD-MCNC: 23 MG/DL (ref 7–18)
BUN/CREAT SERPL: 29.1 (ref 10–20)
CALCIUM BLD-MCNC: 8.7 MG/DL (ref 8.5–10.1)
CHLORIDE SERPL-SCNC: 106 MMOL/L (ref 98–112)
CO2 SERPL-SCNC: 27 MMOL/L (ref 21–32)
CREAT BLD-MCNC: 0.79 MG/DL (ref 0.7–1.3)
DEPRECATED RDW RBC AUTO: 46.8 FL (ref 35.1–46.3)
EOSINOPHIL # BLD AUTO: 0.12 X10(3) UL (ref 0–0.7)
EOSINOPHIL NFR BLD AUTO: 2.5 %
ERYTHROCYTE [DISTWIDTH] IN BLOOD BY AUTOMATED COUNT: 13.2 % (ref 11–15)
GLOBULIN PLAS-MCNC: 3.1 G/DL (ref 2.8–4.4)
GLUCOSE BLD-MCNC: 110 MG/DL (ref 70–99)
HCT VFR BLD AUTO: 43.2 % (ref 39–53)
HGB BLD-MCNC: 14.3 G/DL (ref 13–17.5)
IMM GRANULOCYTES # BLD AUTO: 0.01 X10(3) UL (ref 0–1)
IMM GRANULOCYTES NFR BLD: 0.2 %
LYMPHOCYTES # BLD AUTO: 1.15 X10(3) UL (ref 1–4)
LYMPHOCYTES NFR BLD AUTO: 23.9 %
M PROTEIN MFR SERPL ELPH: 6.9 G/DL (ref 6.4–8.2)
MCH RBC QN AUTO: 32 PG (ref 26–34)
MCHC RBC AUTO-ENTMCNC: 33.1 G/DL (ref 31–37)
MCV RBC AUTO: 96.6 FL (ref 80–100)
MONOCYTES # BLD AUTO: 0.41 X10(3) UL (ref 0.1–1)
MONOCYTES NFR BLD AUTO: 8.5 %
NEUTROPHILS # BLD AUTO: 3.1 X10 (3) UL (ref 1.5–7.7)
NEUTROPHILS # BLD AUTO: 3.1 X10(3) UL (ref 1.5–7.7)
NEUTROPHILS NFR BLD AUTO: 64.3 %
OSMOLALITY SERPL CALC.SUM OF ELEC: 298 MOSM/KG (ref 275–295)
P-R INTERVAL: 218 MS
PLATELET # BLD AUTO: 174 10(3)UL (ref 150–450)
POTASSIUM SERPL-SCNC: 3.7 MMOL/L (ref 3.5–5.1)
Q-T INTERVAL: 396 MS
QRS DURATION: 88 MS
QTC CALCULATION (BEZET): 442 MS
R AXIS: -3 DEGREES
RBC # BLD AUTO: 4.47 X10(6)UL (ref 3.8–5.8)
SODIUM SERPL-SCNC: 142 MMOL/L (ref 136–145)
T AXIS: -15 DEGREES
TROPONIN I SERPL-MCNC: <0.045 NG/ML (ref ?–0.04)
VENTRICULAR RATE: 75 BPM
WBC # BLD AUTO: 4.8 X10(3) UL (ref 4–11)

## 2019-01-01 PROCEDURE — 99283 EMERGENCY DEPT VISIT LOW MDM: CPT | Performed by: EMERGENCY MEDICINE

## 2019-01-01 PROCEDURE — 99284 EMERGENCY DEPT VISIT MOD MDM: CPT

## 2019-01-01 PROCEDURE — 84484 ASSAY OF TROPONIN QUANT: CPT

## 2019-01-01 PROCEDURE — 99397 PER PM REEVAL EST PAT 65+ YR: CPT | Performed by: FAMILY MEDICINE

## 2019-01-01 PROCEDURE — 84484 ASSAY OF TROPONIN QUANT: CPT | Performed by: EMERGENCY MEDICINE

## 2019-01-01 PROCEDURE — 80053 COMPREHEN METABOLIC PANEL: CPT

## 2019-01-01 PROCEDURE — 93005 ELECTROCARDIOGRAM TRACING: CPT

## 2019-01-01 PROCEDURE — 87086 URINE CULTURE/COLONY COUNT: CPT

## 2019-01-01 PROCEDURE — G0009 ADMIN PNEUMOCOCCAL VACCINE: HCPCS | Performed by: FAMILY MEDICINE

## 2019-01-01 PROCEDURE — G0439 PPPS, SUBSEQ VISIT: HCPCS | Performed by: FAMILY MEDICINE

## 2019-01-01 PROCEDURE — G0008 ADMIN INFLUENZA VIRUS VAC: HCPCS | Performed by: FAMILY MEDICINE

## 2019-01-01 PROCEDURE — 93010 ELECTROCARDIOGRAM REPORT: CPT

## 2019-01-01 PROCEDURE — 36415 COLL VENOUS BLD VENIPUNCTURE: CPT

## 2019-01-01 PROCEDURE — 80061 LIPID PANEL: CPT

## 2019-01-01 PROCEDURE — 90732 PPSV23 VACC 2 YRS+ SUBQ/IM: CPT | Performed by: FAMILY MEDICINE

## 2019-01-01 PROCEDURE — 99285 EMERGENCY DEPT VISIT HI MDM: CPT

## 2019-01-01 PROCEDURE — 96160 PT-FOCUSED HLTH RISK ASSMT: CPT | Performed by: FAMILY MEDICINE

## 2019-01-01 PROCEDURE — 80053 COMPREHEN METABOLIC PANEL: CPT | Performed by: EMERGENCY MEDICINE

## 2019-01-01 PROCEDURE — 85025 COMPLETE CBC W/AUTO DIFF WBC: CPT | Performed by: EMERGENCY MEDICINE

## 2019-01-01 PROCEDURE — 71045 X-RAY EXAM CHEST 1 VIEW: CPT | Performed by: EMERGENCY MEDICINE

## 2019-01-01 PROCEDURE — 82607 VITAMIN B-12: CPT

## 2019-01-01 PROCEDURE — 99214 OFFICE O/P EST MOD 30 MIN: CPT | Performed by: FAMILY MEDICINE

## 2019-01-01 PROCEDURE — 51702 INSERT TEMP BLADDER CATH: CPT | Performed by: EMERGENCY MEDICINE

## 2019-01-01 PROCEDURE — 85025 COMPLETE CBC W/AUTO DIFF WBC: CPT

## 2019-01-01 PROCEDURE — 74176 CT ABD & PELVIS W/O CONTRAST: CPT | Performed by: EMERGENCY MEDICINE

## 2019-01-01 PROCEDURE — 90662 IIV NO PRSV INCREASED AG IM: CPT | Performed by: FAMILY MEDICINE

## 2019-01-01 PROCEDURE — 81001 URINALYSIS AUTO W/SCOPE: CPT

## 2019-01-01 PROCEDURE — 81003 URINALYSIS AUTO W/O SCOPE: CPT | Performed by: EMERGENCY MEDICINE

## 2019-01-01 RX ORDER — LOSARTAN POTASSIUM 100 MG/1
TABLET ORAL
Qty: 90 TABLET | Refills: 1 | Status: SHIPPED | OUTPATIENT
Start: 2019-01-01 | End: 2020-01-01

## 2019-01-01 RX ORDER — SULFAMETHOXAZOLE AND TRIMETHOPRIM 800; 160 MG/1; MG/1
1 TABLET ORAL 2 TIMES DAILY
Qty: 6 TABLET | Refills: 0 | Status: SHIPPED | OUTPATIENT
Start: 2019-01-01 | End: 2019-01-01

## 2019-01-01 RX ORDER — ALFUZOSIN HYDROCHLORIDE 10 MG/1
TABLET, EXTENDED RELEASE ORAL
Qty: 90 TABLET | Refills: 0 | Status: SHIPPED | OUTPATIENT
Start: 2019-01-01 | End: 2020-01-01

## 2019-01-01 RX ORDER — ALFUZOSIN HYDROCHLORIDE 10 MG/1
TABLET, EXTENDED RELEASE ORAL
Qty: 90 TABLET | Refills: 1 | Status: SHIPPED | OUTPATIENT
Start: 2019-01-01 | End: 2019-01-01

## 2019-01-11 ENCOUNTER — TELEPHONE (OUTPATIENT)
Dept: FAMILY MEDICINE CLINIC | Facility: CLINIC | Age: 84
End: 2019-01-11

## 2019-01-11 DIAGNOSIS — H57.89 BURNING SENSATION OF EYE: Primary | ICD-10-CM

## 2019-01-11 DIAGNOSIS — R45.89 COMPLAINING OF TEARFULNESS: ICD-10-CM

## 2019-01-11 PROBLEM — M67.432 GANGLION CYST OF VOLAR ASPECT OF LEFT WRIST: Status: ACTIVE | Noted: 2019-01-11

## 2019-01-11 NOTE — TELEPHONE ENCOUNTER
Requesting Referral Opthalmology    LOV: 12/10/18  RTC: 3 mos  PT was previously referred to Dr Milagros Braga for cataracts.   Referral pended if appropriate   Future Appointments   Date Time Provider Ross Wynn   1/25/2019  3:30 PM Sunny Nolan MD

## 2019-01-11 NOTE — TELEPHONE ENCOUNTER
Opthamologist Referral   Received:  Today   Message Contents   Elsie [facility]   Cc: [facility] Referral Pool   Phone Number: [phone]             .Reason for the order/referral: Tears, burning in both eyes   PCP:  Dr. Love Barreto

## 2019-02-09 ENCOUNTER — HOSPITAL ENCOUNTER (OUTPATIENT)
Age: 84
Discharge: HOME | End: 2019-02-09
Attending: FAMILY MEDICINE
Payer: MEDICARE

## 2019-02-09 VITALS
SYSTOLIC BLOOD PRESSURE: 176 MMHG | WEIGHT: 140 LBS | BODY MASS INDEX: 23.9 KG/M2 | HEIGHT: 64 IN | DIASTOLIC BLOOD PRESSURE: 85 MMHG | OXYGEN SATURATION: 97 % | RESPIRATION RATE: 12 BRPM | TEMPERATURE: 98 F | HEART RATE: 75 BPM

## 2019-02-09 DIAGNOSIS — L29.0 PERIANAL IRRITATION: ICD-10-CM

## 2019-02-09 DIAGNOSIS — K64.8 INTERNAL HEMORRHOIDS: Primary | ICD-10-CM

## 2019-02-09 PROCEDURE — 99202 OFFICE O/P NEW SF 15 MIN: CPT

## 2019-02-09 PROCEDURE — 99212 OFFICE O/P EST SF 10 MIN: CPT

## 2019-02-09 NOTE — ED INITIAL ASSESSMENT (HPI)
Pt has had anal itching all the way up to his scrotal area. Denies any urinary symptoms, itches at night only. Denies ant diarrhea or constipation or blood in stool.    used #[id number]

## 2019-02-09 NOTE — ED PROVIDER NOTES
Patient Seen in: [facility]    History   Patient presents with:  Anal Problem (GI)    Stated Complaint: Urinary symptoms, anal discharge  History with the help of a language line   HPI  82year-old gentleman with his and moist. No oropharyngeal exudate. Slightly impaired hearing   Eyes: Conjunctivae and EOM are normal. Pupils are equal, round, and reactive to light. Right eye exhibits no discharge. Left eye exhibits no discharge. Neck: Normal range of motion.  Neck

## 2019-02-11 RX ORDER — UBIDECARENONE 75 MG
CAPSULE ORAL
Qty: 90 TABLET | Refills: 1 | Status: SHIPPED | OUTPATIENT
Start: 2019-02-11 | End: 2019-01-01

## 2019-02-11 NOTE — TELEPHONE ENCOUNTER
Requesting B-12 TR 1000 MCG Oral Tab  LOV: 12/10/18  RTC: 3 months  Last Relevant Labs: 10/23/17 (b12)  Filled: 8/16/18 # 90with 1 refills    Future Appointments   Date Time Provider Ross Wynn   2/14/2019  2:30 PM Cammy Ríos [place] [id number] [address]

## 2019-02-12 ENCOUNTER — OFFICE VISIT (OUTPATIENT)
Dept: FAMILY MEDICINE CLINIC | Facility: CLINIC | Age: 84
End: 2019-02-12
Payer: MEDICARE

## 2019-02-12 VITALS
TEMPERATURE: 98 F | HEIGHT: 64 IN | BODY MASS INDEX: 24.07 KG/M2 | HEART RATE: 66 BPM | WEIGHT: 141 LBS | DIASTOLIC BLOOD PRESSURE: 78 MMHG | SYSTOLIC BLOOD PRESSURE: 130 MMHG | RESPIRATION RATE: 16 BRPM

## 2019-02-12 DIAGNOSIS — N40.1 BENIGN PROSTATIC HYPERPLASIA WITH URINARY FREQUENCY: ICD-10-CM

## 2019-02-12 DIAGNOSIS — R35.0 BENIGN PROSTATIC HYPERPLASIA WITH URINARY FREQUENCY: ICD-10-CM

## 2019-02-12 DIAGNOSIS — K62.89 ANAL IRRITATION: Primary | ICD-10-CM

## 2019-02-12 DIAGNOSIS — F51.01 PRIMARY INSOMNIA: ICD-10-CM

## 2019-02-12 PROCEDURE — 99214 OFFICE O/P EST MOD 30 MIN: CPT | Performed by: FAMILY MEDICINE

## 2019-02-12 RX ORDER — TRAZODONE HYDROCHLORIDE 50 MG/1
50 TABLET ORAL NIGHTLY
Qty: 90 TABLET | Refills: 1 | Status: SHIPPED | OUTPATIENT
Start: 2019-02-12

## 2019-02-12 NOTE — PROGRESS NOTES
HPI:    Patient ID: Nisa Baumann is a 82year old male. HPI  Mr. Bertrand Bear is a pleasant 47-year-old male with history of hypertension, hyperlipidemia, BPH, carotid artery disease ,status post pacemaker placement accompanied by his son-in-law.   He was r IN OU AS NEEDED Disp:  Rfl: 1   ATORVASTATIN 40 MG Oral Tab TAKE 1 TABLET BY MOUTH AT BEDTIME Disp: 90 tablet Rfl: 2   LOSARTAN 100 MG Oral Tab TAKE 1 TABLET(100 MG) BY MOUTH DAILY Disp: 90 tablet Rfl: 1   ALFUZOSIN HCL ER 10 MG Oral Tablet 24 Hr TAKE 1 TA to stop taking aspirin starting tomorrow until the day of surgery; also avoid using NSAIDs or any form of blood thinners or multivitamins. No orders of the defined types were placed in this encounter.       Meds This Visit:  Requested Prescriptions     Sig

## 2019-02-12 NOTE — PATIENT INSTRUCTIONS
Thank you for choosing Kenny De León MD at [place] [address]  To Do: [facility]  1. May hold Aspirin tomorrow and day of surgery  [facility] is located in [address]. Monday, Tuesday & Friday – 8 a.m. to 4 p.m.   Wednesday, Thursday – 7 benefits outweigh those potential risks and we strive to make you healthier and to improve your quality of life.     Referrals, and Radiology Information:    If your insurance requires a referral to a specialist, please allow 5 business days to process your

## 2019-02-13 NOTE — H&P
Office Visit     2/13/2019  [address]      Norberto Lin MD   SURGERY, ORTHOPEDIC   Ganglion cyst of volar aspect of left wrist   Dx   Follow - Up   ; Referred by Genet Kline MD   Reason for Visit           Reas Social History    Tobacco Use      Smoking status: Former Smoker      Smokeless tobacco: Never Used    Alcohol use: No    Drug use:  No            REVIEW OF SYSTEMS:   A 12 point review of systems was performed as documented on the intake form and reviewed

## 2019-02-14 ENCOUNTER — ANESTHESIA (OUTPATIENT)
Dept: SURGERY | Facility: HOSPITAL | Age: 84
End: 2019-02-14

## 2019-02-14 ENCOUNTER — HOSPITAL ENCOUNTER (OUTPATIENT)
Facility: HOSPITAL | Age: 84
Setting detail: HOSPITAL OUTPATIENT SURGERY
Discharge: HOME | End: 2019-02-14
Attending: ORTHOPAEDIC SURGERY | Admitting: ORTHOPAEDIC SURGERY
Payer: MEDICARE

## 2019-02-14 ENCOUNTER — ANESTHESIA EVENT (OUTPATIENT)
Dept: SURGERY | Facility: HOSPITAL | Age: 84
End: 2019-02-14

## 2019-02-14 VITALS
TEMPERATURE: 98 F | HEART RATE: 77 BPM | DIASTOLIC BLOOD PRESSURE: 87 MMHG | HEIGHT: 64 IN | RESPIRATION RATE: 16 BRPM | SYSTOLIC BLOOD PRESSURE: 150 MMHG | OXYGEN SATURATION: 95 % | WEIGHT: 140 LBS | BODY MASS INDEX: 23.9 KG/M2

## 2019-02-14 DIAGNOSIS — M67.432 GANGLION CYST OF VOLAR ASPECT OF LEFT WRIST: ICD-10-CM

## 2019-02-14 PROCEDURE — 88304 TISSUE EXAM BY PATHOLOGIST: CPT | Performed by: ORTHOPAEDIC SURGERY

## 2019-02-14 PROCEDURE — 0LB60ZZ EXCISION OF LEFT LOWER ARM AND WRIST TENDON, OPEN APPROACH: ICD-10-PCS | Performed by: ORTHOPAEDIC SURGERY

## 2019-02-14 RX ORDER — HYDROCODONE BITARTRATE AND ACETAMINOPHEN 5; 325 MG/1; MG/1
1 TABLET ORAL AS NEEDED
Status: DISCONTINUED | OUTPATIENT
Start: 2019-02-14 | End: 2019-02-14

## 2019-02-14 RX ORDER — ACETAMINOPHEN 500 MG
1000 TABLET ORAL ONCE
Status: DISCONTINUED | OUTPATIENT
Start: 2019-02-14 | End: 2019-02-14

## 2019-02-14 RX ORDER — CEFAZOLIN SODIUM/WATER 2 G/20 ML
2 SYRINGE (ML) INTRAVENOUS ONCE
Status: COMPLETED | OUTPATIENT
Start: 2019-02-14 | End: 2019-02-14

## 2019-02-14 RX ORDER — ACETAMINOPHEN 500 MG
1000 TABLET ORAL ONCE
Status: ON HOLD | COMMUNITY
End: 2019-02-14

## 2019-02-14 RX ORDER — ACETAMINOPHEN 500 MG
1000 TABLET ORAL ONCE AS NEEDED
Status: DISCONTINUED | OUTPATIENT
Start: 2019-02-14 | End: 2019-02-14

## 2019-02-14 RX ORDER — SODIUM CHLORIDE, SODIUM LACTATE, POTASSIUM CHLORIDE, CALCIUM CHLORIDE 600; 310; 30; 20 MG/100ML; MG/100ML; MG/100ML; MG/100ML
INJECTION, SOLUTION INTRAVENOUS CONTINUOUS
Status: DISCONTINUED | OUTPATIENT
Start: 2019-02-14 | End: 2019-02-14

## 2019-02-14 RX ORDER — LABETALOL HYDROCHLORIDE 5 MG/ML
5 INJECTION, SOLUTION INTRAVENOUS EVERY 5 MIN PRN
Status: DISCONTINUED | OUTPATIENT
Start: 2019-02-14 | End: 2019-02-14

## 2019-02-14 RX ORDER — HYDROCODONE BITARTRATE AND ACETAMINOPHEN 5; 325 MG/1; MG/1
2 TABLET ORAL AS NEEDED
Status: DISCONTINUED | OUTPATIENT
Start: 2019-02-14 | End: 2019-02-14

## 2019-02-14 RX ORDER — BUPIVACAINE HYDROCHLORIDE 2.5 MG/ML
INJECTION, SOLUTION EPIDURAL; INFILTRATION; INTRACAUDAL AS NEEDED
Status: DISCONTINUED | OUTPATIENT
Start: 2019-02-14 | End: 2019-02-14 | Stop reason: HOSPADM

## 2019-02-14 RX ORDER — ACETAMINOPHEN AND CODEINE PHOSPHATE 300; 30 MG/1; MG/1
1-2 TABLET ORAL EVERY 6 HOURS PRN
Qty: 20 TABLET | Refills: 0 | Status: SHIPPED | OUTPATIENT
Start: 2019-02-14 | End: 2019-02-24

## 2019-02-14 RX ORDER — ONDANSETRON 2 MG/ML
4 INJECTION INTRAMUSCULAR; INTRAVENOUS AS NEEDED
Status: DISCONTINUED | OUTPATIENT
Start: 2019-02-14 | End: 2019-02-14

## 2019-02-14 RX ORDER — NALOXONE HYDROCHLORIDE 0.4 MG/ML
80 INJECTION, SOLUTION INTRAMUSCULAR; INTRAVENOUS; SUBCUTANEOUS AS NEEDED
Status: DISCONTINUED | OUTPATIENT
Start: 2019-02-14 | End: 2019-02-14

## 2019-02-14 NOTE — OPERATIVE REPORT
[facility]    PATIENT'S NAME: Narcisa Scherer   ATTENDING PHYSICIAN: Elise Carrillo M.D. OPERATING PHYSICIAN: Elise Carrillo M.D.    PATIENT ACCOUNT#:   [de-identified]    LOCATION:  [address] [place] [address] 10  MEDICAL RECORD #:   [id number]       D

## 2019-02-14 NOTE — ANESTHESIA POSTPROCEDURE EVALUATION
[facility] Patient Status:  Hospital Outpatient Surgery   Age/Gender 82year old male MRN [id number]   Location [address] [place] [address] Attending Ti Hickman, [facility] Day # 0 PCP MD Juana Johns

## 2019-02-14 NOTE — ANESTHESIA PREPROCEDURE EVALUATION
PRE-OP EVALUATION    Patient Name: Kyle Moss    Pre-op Diagnosis: Ganglion cyst of volar aspect of left wrist [M67.432]    Procedure(s):  LEFT VOLAR WRIST GANGLION CYST EXCISION    Surgeon(s) and Role:     Jessy Goodman MD - Primary    Pre-op vi >4      (+) hypertension   (+) hyperlipidemia        (+) pacemaker/AICD                          Endo/Other                           (+) arthritis       Pulmonary    Negative pulmonary ROS.                        Neuro/Psych  Comment: Carotid artery stenos requirement for anesthesia and agrees to proceed. All questions answered and consent signed in chart.     Kimmy Wallace MD  Anesthesiologist  Pager [phone]      Present on Admission:  **None**

## 2019-02-14 NOTE — BRIEF OP NOTE
Pre-Operative Diagnosis: Ganglion cyst of volar aspect of left wrist [M67.432]     Post-Operative Diagnosis: Ganglion cyst of volar aspect of left wrist [M67.432]      Procedure Performed:   Procedure(s):  LEFT VOLAR WRIST GANGLION CYST EXCISION    Surgeon

## 2019-02-15 RX ORDER — ALFUZOSIN HYDROCHLORIDE 10 MG/1
TABLET, EXTENDED RELEASE ORAL
Qty: 90 TABLET | Refills: 0 | Status: SHIPPED | OUTPATIENT
Start: 2019-02-15 | End: 2019-01-01

## 2019-02-15 NOTE — TELEPHONE ENCOUNTER
Requested Medications      Name from pharmacy: ALFUZOSIN ER 10MG TABLETS         Will file in chart as: ALFUZOSIN HCL ER 10 MG Oral Tablet 24 Hr    Sig: TAKE 1 TABLET(10 MG) BY MOUTH EVERY DAY    Disp:  90 tablet    Refills:  0    Start: 2/14/2019    Class

## 2019-02-18 RX ORDER — NAPROXEN 375 MG/1
TABLET ORAL
Qty: 180 TABLET | Refills: 1 | Status: SHIPPED | OUTPATIENT
Start: 2019-02-18 | End: 2019-03-28

## 2019-02-18 NOTE — TELEPHONE ENCOUNTER
Requesting NAPROXEN 375 MG   LOV: 2/12/19  RTC:   Last Relevant Labs:   Filled: 2/5/18 # 180 with 1 refills    Future Appointments   Date Time Provider Ross Wynn   2/26/2019  3:40 PM Renuka Zimmer MD MMO NP SYLVESTER Rodriguez W Lesley Nicole   3/7/2019 12:00 PM M

## 2019-02-28 ENCOUNTER — TELEPHONE (OUTPATIENT)
Dept: FAMILY MEDICINE CLINIC | Facility: CLINIC | Age: 84
End: 2019-02-28

## 2019-02-28 DIAGNOSIS — H35.373 MACULAR PUCKERING OF RETINA, BILATERAL: Primary | ICD-10-CM

## 2019-02-28 RX ORDER — LOSARTAN POTASSIUM 100 MG/1
TABLET ORAL
Qty: 90 TABLET | Refills: 1 | Status: SHIPPED | OUTPATIENT
Start: 2019-02-28 | End: 2019-01-01

## 2019-02-28 NOTE — TELEPHONE ENCOUNTER
Unable to locate Dr. Panda Sood note. [phone] and they are closed on Thursdays. Called daughter, Rosalie Walls, and informed her of the closed office and to request she research if the two doctors are in 's insurance network.  She expressed understanding a

## 2019-02-28 NOTE — TELEPHONE ENCOUNTER
Requesting: Losartan 100 mg tabs  LOV: 2/12/19  RTC: 4 wks  Last Relevant Labs: 12/16/18  Filled: 9/4/18 #90 with 1 refills    Future Appointments   Date Time Provider Ross Wynn   3/7/2019 12:00 PM [id number] DEVICE REMOTE [place] [id number] CARD [facility]

## 2019-02-28 NOTE — TELEPHONE ENCOUNTER
I called the patient as he had a duplicate appt scheduled.  I spoke to his daughter, Myles Roque (ok per verbal), and she stated they need to request a referral. She said patient saw Dr Lion Obrien and he was to send us chart notes as the patient has a \"wrinkle on

## 2019-03-01 NOTE — TELEPHONE ENCOUNTER
Report faxed over.  Not noted that pt needs to see retina specialist. Awaiting call back from Dr. Alex Phipps office regarding question of retina specialist.

## 2019-03-04 NOTE — TELEPHONE ENCOUNTER
Left detailed message on pt's daughter's vm letting her know referral to  has been placed and information to schedule an appt. She was asked to [facility] if she has any further questions.

## 2019-03-07 ENCOUNTER — LAB ENCOUNTER (OUTPATIENT)
Dept: LAB | Age: 84
End: 2019-03-07
Attending: FAMILY MEDICINE
Payer: MEDICARE

## 2019-03-07 DIAGNOSIS — E78.2 MIXED HYPERLIPIDEMIA: Primary | ICD-10-CM

## 2019-03-07 DIAGNOSIS — N40.0 BENIGN PROSTATIC HYPERPLASIA WITHOUT LOWER URINARY TRACT SYMPTOMS: ICD-10-CM

## 2019-03-07 DIAGNOSIS — E78.2 MIXED HYPERLIPIDEMIA: ICD-10-CM

## 2019-03-07 LAB
ALBUMIN SERPL-MCNC: 4.1 G/DL (ref 3.4–5)
ALBUMIN/GLOB SERPL: 1.4 {RATIO} (ref 1–2)
ALP LIVER SERPL-CCNC: 99 U/L (ref 45–117)
ALT SERPL-CCNC: 23 U/L (ref 16–61)
ANION GAP SERPL CALC-SCNC: 7 MMOL/L (ref 0–18)
AST SERPL-CCNC: 14 U/L (ref 15–37)
BASOPHILS # BLD AUTO: 0.03 X10(3) UL (ref 0–0.2)
BASOPHILS NFR BLD AUTO: 0.7 %
BILIRUB SERPL-MCNC: 0.7 MG/DL (ref 0.1–2)
BUN BLD-MCNC: 22 MG/DL (ref 7–18)
BUN/CREAT SERPL: 26.2 (ref 10–20)
CALCIUM BLD-MCNC: 9 MG/DL (ref 8.5–10.1)
CHLORIDE SERPL-SCNC: 110 MMOL/L (ref 98–107)
CHOLEST SMN-MCNC: 107 MG/DL (ref ?–200)
CO2 SERPL-SCNC: 27 MMOL/L (ref 21–32)
COMPLEXED PSA SERPL-MCNC: 1.09 NG/ML (ref ?–4)
CREAT BLD-MCNC: 0.84 MG/DL (ref 0.7–1.3)
DEPRECATED RDW RBC AUTO: 46.4 FL (ref 35.1–46.3)
EOSINOPHIL # BLD AUTO: 0.05 X10(3) UL (ref 0–0.7)
EOSINOPHIL NFR BLD AUTO: 1.1 %
ERYTHROCYTE [DISTWIDTH] IN BLOOD BY AUTOMATED COUNT: 13.2 % (ref 11–15)
GLOBULIN PLAS-MCNC: 2.9 G/DL (ref 2.8–4.4)
GLUCOSE BLD-MCNC: 102 MG/DL (ref 70–99)
HCT VFR BLD AUTO: 43.4 % (ref 39–53)
HDLC SERPL-MCNC: 48 MG/DL (ref 40–59)
HGB BLD-MCNC: 14.8 G/DL (ref 13–17.5)
IMM GRANULOCYTES # BLD AUTO: 0.01 X10(3) UL (ref 0–1)
IMM GRANULOCYTES NFR BLD: 0.2 %
LDLC SERPL CALC-MCNC: 47 MG/DL (ref ?–100)
LYMPHOCYTES # BLD AUTO: 0.89 X10(3) UL (ref 1–4)
LYMPHOCYTES NFR BLD AUTO: 19.6 %
M PROTEIN MFR SERPL ELPH: 7 G/DL (ref 6.4–8.2)
MCH RBC QN AUTO: 32.5 PG (ref 26–34)
MCHC RBC AUTO-ENTMCNC: 34.1 G/DL (ref 31–37)
MCV RBC AUTO: 95.4 FL (ref 80–100)
MONOCYTES # BLD AUTO: 0.45 X10(3) UL (ref 0.1–1)
MONOCYTES NFR BLD AUTO: 9.9 %
NEUTROPHILS # BLD AUTO: 3.11 X10 (3) UL (ref 1.5–7.7)
NEUTROPHILS # BLD AUTO: 3.11 X10(3) UL (ref 1.5–7.7)
NEUTROPHILS NFR BLD AUTO: 68.5 %
NONHDLC SERPL-MCNC: 59 MG/DL (ref ?–130)
OSMOLALITY SERPL CALC.SUM OF ELEC: 302 MOSM/KG (ref 275–295)
PLATELET # BLD AUTO: 188 10(3)UL (ref 150–450)
POTASSIUM SERPL-SCNC: 4 MMOL/L (ref 3.5–5.1)
RBC # BLD AUTO: 4.55 X10(6)UL (ref 3.8–5.8)
SODIUM SERPL-SCNC: 144 MMOL/L (ref 136–145)
TRIGL SERPL-MCNC: 60 MG/DL (ref 30–149)
VLDLC SERPL CALC-MCNC: 12 MG/DL (ref 0–30)
WBC # BLD AUTO: 4.5 X10(3) UL (ref 4–11)

## 2019-03-07 PROCEDURE — 36415 COLL VENOUS BLD VENIPUNCTURE: CPT

## 2019-03-07 PROCEDURE — 80053 COMPREHEN METABOLIC PANEL: CPT

## 2019-03-07 PROCEDURE — 85025 COMPLETE CBC W/AUTO DIFF WBC: CPT

## 2019-03-07 PROCEDURE — 80061 LIPID PANEL: CPT

## 2019-03-28 ENCOUNTER — OFFICE VISIT (OUTPATIENT)
Dept: FAMILY MEDICINE CLINIC | Facility: CLINIC | Age: 84
End: 2019-03-28
Payer: MEDICARE

## 2019-03-28 VITALS
HEIGHT: 64 IN | TEMPERATURE: 99 F | WEIGHT: 142 LBS | HEART RATE: 70 BPM | SYSTOLIC BLOOD PRESSURE: 140 MMHG | RESPIRATION RATE: 16 BRPM | DIASTOLIC BLOOD PRESSURE: 80 MMHG | BODY MASS INDEX: 24.24 KG/M2

## 2019-03-28 DIAGNOSIS — F51.01 PRIMARY INSOMNIA: ICD-10-CM

## 2019-03-28 DIAGNOSIS — M19.90 ARTHRITIS: Primary | ICD-10-CM

## 2019-03-28 PROCEDURE — 99214 OFFICE O/P EST MOD 30 MIN: CPT | Performed by: FAMILY MEDICINE

## 2019-03-28 RX ORDER — MELOXICAM 15 MG/1
15 TABLET ORAL DAILY
Qty: 90 TABLET | Refills: 1 | Status: ON HOLD | OUTPATIENT
Start: 2019-03-28 | End: 2020-01-01

## 2019-03-28 NOTE — PATIENT INSTRUCTIONS
Thank you for choosing Mikey Damon MD at [place] [address]  To Do: [facility]  1. Please take meds as directed. [facility] is located in [address]. Monday, Tuesday & Friday – 8 a.m. to 4 p.m.   Wednesday, Thursday – 7 a.m. to 3 p.m those potential risks and we strive to make you healthier and to improve your quality of life.     Referrals, and Radiology Information:    If your insurance requires a referral to a specialist, please allow 5 business days to process your referral request.

## 2019-03-28 NOTE — PROGRESS NOTES
HPI:    Patient ID: Stefan Moran is a 82year old male. HPI  Mr. Xiong is a pleasant 82year-old male with history of hypertension, hyperlipidemia, arthritis,  BPH, carotid artery disease ,status post pacemaker placement accompanied by his daughter Solution INT 1 GTT IN OU AS NEEDED Disp:  Rfl: 1   ATORVASTATIN 40 MG Oral Tab TAKE 1 TABLET BY MOUTH AT BEDTIME Disp: 90 tablet Rfl: 2   HydrOXYzine HCl 25 MG Oral Tab Take 25 mg by mouth daily as needed.    Disp:  Rfl:    aspirin 81 MG Oral Tab Take 81 mg

## 2019-04-01 ENCOUNTER — OFFICE VISIT (OUTPATIENT)
Dept: FAMILY MEDICINE CLINIC | Facility: CLINIC | Age: 84
End: 2019-04-01
Payer: MEDICARE

## 2019-04-01 VITALS
RESPIRATION RATE: 16 BRPM | TEMPERATURE: 98 F | BODY MASS INDEX: 23.73 KG/M2 | HEART RATE: 76 BPM | WEIGHT: 139 LBS | DIASTOLIC BLOOD PRESSURE: 80 MMHG | HEIGHT: 64 IN | SYSTOLIC BLOOD PRESSURE: 142 MMHG | OXYGEN SATURATION: 99 %

## 2019-04-01 DIAGNOSIS — N30.00 ACUTE CYSTITIS WITHOUT HEMATURIA: ICD-10-CM

## 2019-04-01 DIAGNOSIS — R30.0 DYSURIA: Primary | ICD-10-CM

## 2019-04-01 PROCEDURE — 81003 URINALYSIS AUTO W/O SCOPE: CPT | Performed by: FAMILY MEDICINE

## 2019-04-01 PROCEDURE — 87086 URINE CULTURE/COLONY COUNT: CPT | Performed by: FAMILY MEDICINE

## 2019-04-01 PROCEDURE — 99213 OFFICE O/P EST LOW 20 MIN: CPT | Performed by: FAMILY MEDICINE

## 2019-04-01 RX ORDER — SULFAMETHOXAZOLE AND TRIMETHOPRIM 800; 160 MG/1; MG/1
1 TABLET ORAL 2 TIMES DAILY
Qty: 6 TABLET | Refills: 0 | Status: SHIPPED | OUTPATIENT
Start: 2019-04-01 | End: 2019-04-04

## 2019-06-04 NOTE — PROGRESS NOTES
HPI:   Lulu Cade is a 82year old male who presents for a MA (Medicare Advantage) [facility] (Once per calendar year).     Mr. Rene Serrano is a pleasant 82year-old male with history of hypertension, hyperlipidemia, arthritis,  BPH, carotid artery disease History    Tobacco Use      Smoking status: Former Smoker      Smokeless tobacco: Never Used       Mr. Xiong already takes aspirin and has it on his medication list.   CAGE Alcohol screening   Bryant Vazquez was screened for Alcohol abuse and had a score (09/30/2016); cataract; pacemaker/defibrillator (09/30/2016); and forearm/wrist surgery unlisted (Left, 2/14/19--Dr. Carla Gutierrez). His Family history is unknown by patient. SOCIAL HISTORY:   He  reports that he has quit smoking.  He has never used smo Prsv Free 0.5 ml (99856) 10/17/2017   • Fluvirin, 3 Years & >, Im 01/11/2013   • Influenza 10/21/2016   • Pneumococcal (Prevnar 13) 12/10/2018        ASSESSMENT AND OTHER RELEVANT CHRONIC CONDITIONS:   Willian Vaz is a 82year old male who presents for file.     Airna Rodriguez MD, 6/4/2019     [facility]          This section provided for quick review of chart, separate sheet to patient  [facility] Internal Lab or Procedure External Lab or Procedure   Diabetes Sc carrier   Homosexual men   Illicit injectable drug abusers     Tetanus Toxoid  Only covered with a cut with metal- TD and TDaP Not covered by Medicare Part B) No vaccine history found This may be covered with your prescription benefits, but Medicare does n

## 2019-06-04 NOTE — PATIENT INSTRUCTIONS
Thank you for choosing Ai Andersen MD at [place] [address]  To Do: [facility]  1. Please see age appropriate health prevention below    [facility] is located in [address]. Monday, Tuesday & Friday – 8 a.m. to 4 p.m.   Wednesday, Thurs the benefits outweigh those potential risks and we strive to make you healthier and to improve your quality of life.     Referrals, and Radiology Information:    If your insurance requires a referral to a specialist, please allow 5 business days to process sistólica es de entre 635 y 139 mm Hg o nobles presión arterial diastólica es de entre [de-identified] y 80 mm Hg   Cáncer colorrectal Todos los hombres de radha sadie de edad Unga sigmoidoscopia flexible cada paxton años o loida colonoscopia cada [place], o un enema con dobl En los exámenes de rutina   Sífilis Los hombres con mayor riesgo de infección; hable con nobles proveedor de [facility] médica En los exámenes de rutina   Tuberculosis Los hombres con mayor riesgo de infección; hable con nobles proveedor [address] [place] [address] hombres de radha sadie de edad [place] dosis   Consejería [place] y [facility] June [birth year] hombres con sobrepeso y obesidad Cuando se diagnostica y, luego, en los exámenes de rutina   Prevención de caídas (ejercicio y suplementos de vitamina D) Glucose (mg/dL)   Date Value   03/07/2019 102 (H)    Medicare covers annually or at 6-month intervals for prediabetic patients        Cardiovascular Disease Screening     Cholesterol, covered every 5 yrs including Total, LDL and Trigs LDL Cholester previous visit.    Annually (age 48 or over), TRIPP not paid separately when covered E/M service is provided on same date    Immunizations      Influenza  Covered Annually Orders placed or performed in visit on 10/17/17   • INFLUENZA VIRUS VACCINE, QUAD, PRES also available in [address])  www. [email or web address]. org  This link also has information from the [address] [place] [address] regarding Advance Directives.

## 2019-08-08 NOTE — ED INITIAL ASSESSMENT (HPI)
PT to the ED for evaluation of palpitations. PT reports that for the last 3 nights in a row he has had episodes, roughly 5 minutes in length, where he feels like his heart is beating quickly and he gets lightheaded.  PT reports afterward he feels tired and

## 2019-08-08 NOTE — ED PROVIDER NOTES
Patient Seen in: [facility] In [place]    History   Patient presents with:  Arrythmia/Palpitations (cardiovascular)    Stated Complaint: palpitations    HPI    Patient with AICD/atrial pacemaker, states that for the past 3 nights he h Current:/85   Pulse 75   Temp 98.1 °F (36.7 °C)   Resp 17   Ht 165.1 cm (5' 5\")   Wt 64 kg   SpO2 95%   BMI 23.46 kg/m²         Physical Exam  Patient appears in good general health. Resting comfortably.   Skin: Warm and dry without cyanosis o arrhythmias in the emergency department. Remained comfortable. Work-up is unremarkable. Advised daughter to call electrophysiologist in the morning to let them know what happened. They may be able to interrogate pacemaker from home.               Dispos

## 2019-08-28 NOTE — TELEPHONE ENCOUNTER
Sent RX. Last refill 2/11/19 #90 (1). Last OV 6/4/19 Dr. Chris Cox with request for 6 month follow up.

## 2019-09-24 NOTE — TELEPHONE ENCOUNTER
Requesting LOSARTAN 100 MG   LOV: 6/4/19  RTC: 6 months  Last Relevant Labs: 8/8/19  Filled: 2/28/19  # 90 with 1 refills    Future Appointments   Date Time Provider Ross Wynn   9/26/2019  2:00 PM [id number] DEVICE REMOTE [place] [id number] CARD [place] [address]

## 2019-12-06 NOTE — TELEPHONE ENCOUNTER
Requested Prescriptions     Pending Prescriptions Disp Refills   • ALFUZOSIN HCL ER 10 MG Oral Tablet 24 Hr [Pharmacy Med Name: ALFUZOSIN ER 10MG TABLETS] 90 tablet 0     Sig: TAKE 1 TABLET(10 MG) BY MOUTH EVERY DAY   • VITAMIN B12 1000 MCG Oral Tab CR [Ph

## 2019-12-11 NOTE — TELEPHONE ENCOUNTER
Patient needs to see Urology this week per Dr Otoniel Mar. Increased urinary frequency. HX of BPH.      Dr Jay Grewal at 12 pm 12/12/19  [facility] 351  [address] [facility],[address], [address] [place] [address], [address] [place] [address]  Phone: [phone]  Appt scheduled, pt informed and agree

## 2019-12-11 NOTE — PROGRESS NOTES
HPI:    Patient ID: Stefan Moran is a 82year old male. HPI  Mr. Xiong is a pleasant 82year-old male with history of hypertension, hyperlipidemia, arthritis,  BPH, carotid artery disease ,status post pacemaker placement.   He is accompanied by his g Take 81 mg by mouth daily. Allergies:No Known Allergies   PHYSICAL EXAM:   Physical Exam   Constitutional: No distress. HENT:   Mouth/Throat: Oropharynx is clear and moist. No oropharyngeal exudate.    Eyes: Conjunctivae are normal. No scleral icter

## 2019-12-11 NOTE — PATIENT INSTRUCTIONS
Thank you for choosing Kenny De León MD at [place] [address]  To Do: [facility]  1. Please take meds as directed. [facility] is located in [address]. Monday, Tuesday & Friday – 8 a.m. to 4 p.m.   Wednesday, Thursday – 7 a.m. to 3 p.m • Please call our office about any questions regarding your treatment/medicines/tests as a result of today's visit.  For your safety, read the entire package insert of all medicines prescribed to you and be aware of all of the risks of treatment even beyon La próstata es loida glándula que se encuentra en la base de la vejiga. Con la edad, la próstata puede comenzar a agrandarse en algunos hombres. Shabnam problema se llama hiperplasia benigna de la próstata (BPH). La BPH causa presión sobre la uretra.  Ése es el · Evite los medicamentos que pueden [employer] síntomas. Por ejemplo, ciertos medicamentos para el resfriado y la Jonathon, y los antidepresivos. Los diuréticos que se usan para tratar la amara presión arterial también pueden empeorar los síntomas.  Hable c © 6342-5105 [employer] [address]. 140[address] [facility], [facility]. Todos los derechos reservados. Esta información no pretende sustituir la atención médica profesional. Sólo nobles médico puede diagnosticar y tratar un problema de dennis.

## 2019-12-13 NOTE — TELEPHONE ENCOUNTER
Spoke with pt's son Asael Jason here in the office. States pt has not urinated since yesterday afternoon. +abdominal pain  Pt saw Urology yesterday and was started on Constantino Lindsay to call urology or seek care at ER for urinary retention.  Contact inf

## 2019-12-13 NOTE — ED PROVIDER NOTES
Patient Seen in: [facility] Emergency Department In [place]      History   Patient presents with:  Urinary Symptoms    Stated Complaint: urinary retention     HPI    Patient is an 28-year-old male comes the ED for evaluation of urinary retention.   Patient HEENT: Normocephalic, atraumatic. Moist mucous membranes. Pupils equal round reactive to light and accommodation, extraocular motion is intact, sclerae white, conjunctiva is pink. Oropharynx is unremarkable, no exudate.   NECK: Supple, trachea midline, concerns. Patient felt comfortable going home.             Disposition and Plan     Clinical Impression:  Urinary retention  (primary encounter diagnosis)    Disposition:  Discharge  12/13/2019 10:34 am    Follow-up:  [address]

## 2019-12-13 NOTE — TELEPHONE ENCOUNTER
Patient walked in to our office today accompanied by relative. Relative states pt has not urinated since yesterday and his bladder is hurting.

## 2019-12-17 NOTE — PATIENT INSTRUCTIONS
Thank you for choosing Inocente Bah MD at [place] [address]  To Do: [facility]  1. Please take meds as directed. [facility] is located in [address]. Monday, Tuesday & Friday – 8 a.m. to 4 p.m.   Wednesday, Thursday – 7 a.m. to 3 p.m those potential risks and we strive to make you healthier and to improve your quality of life.     Referrals, and Radiology Information:    If your insurance requires a referral to a specialist, please allow 5 business days to process your referral request.

## 2019-12-17 NOTE — PROGRESS NOTES
HPI:    Patient ID: Reena Yates is a 82year old male. HPI  Mr. Xiong is a pleasant 82year-old male with history of hypertension, hyperlipidemia, arthritis,  BPH, carotid artery disease ,status post pacemaker placement  Here today to follow-up for • aspirin 81 MG Oral Tab Take 81 mg by mouth daily. Allergies:No Known Allergies   PHYSICAL EXAM:   Physical Exam  Constitutional: No distress. HENT:   Mouth/Throat: Oropharynx is clear and moist. No oropharyngeal exudate.    Eyes: Conjunctivae ar

## 2019-12-18 NOTE — ED PROVIDER NOTES
Patient Seen in: [facility] In [place]      History   Patient presents with:  Urinary Symptoms    Stated Complaint: URINARY SYMPTOMS HAVE CONTINUES SINCE LAST VISIT,    DANIS    Lyn Brown is a pleasant 73-year-old male presenting to the Temp src Temporal   SpO2 97 %   O2 Device None (Room air)       Current:/55   Pulse 73   Temp 99.2 °F (37.3 °C) (Temporal)   Resp 16   Ht 165.1 cm (5' 5\")   Wt 61.2 kg   SpO2 97%   BMI 22.47 kg/m²         Physical Exam    Alert and oriented patien infectious in etiology patient will be discharged home is to return emerge department she symptoms other complaints.   No signs of retention at this time              Disposition and Plan     Clinical Impression:  Frequent urination  (primary encounter diag

## 2020-01-01 ENCOUNTER — TELEPHONE (OUTPATIENT)
Dept: FAMILY MEDICINE CLINIC | Facility: CLINIC | Age: 85
End: 2020-01-01

## 2020-01-01 ENCOUNTER — HOSPITAL ENCOUNTER (EMERGENCY)
Age: 85
Discharge: HOME | End: 2020-01-01
Attending: EMERGENCY MEDICINE
Payer: MEDICARE

## 2020-01-01 ENCOUNTER — LAB ENCOUNTER (OUTPATIENT)
Dept: LAB | Age: 85
End: 2020-01-01
Attending: FAMILY MEDICINE
Payer: MEDICARE

## 2020-01-01 ENCOUNTER — ANESTHESIA EVENT (OUTPATIENT)
Dept: SURGERY | Facility: HOSPITAL | Age: 85
End: 2020-01-01
Payer: MEDICARE

## 2020-01-01 ENCOUNTER — HOSPITAL ENCOUNTER (OUTPATIENT)
Facility: HOSPITAL | Age: 85
Discharge: HOME | End: 2020-01-01
Attending: UROLOGY | Admitting: UROLOGY
Payer: MEDICARE

## 2020-01-01 ENCOUNTER — OFFICE VISIT (OUTPATIENT)
Dept: FAMILY MEDICINE CLINIC | Facility: CLINIC | Age: 85
End: 2020-01-01
Payer: MEDICARE

## 2020-01-01 ENCOUNTER — ANESTHESIA (OUTPATIENT)
Dept: SURGERY | Facility: HOSPITAL | Age: 85
End: 2020-01-01
Payer: MEDICARE

## 2020-01-01 ENCOUNTER — APPOINTMENT (OUTPATIENT)
Dept: LAB | Age: 85
End: 2020-01-01
Attending: FAMILY MEDICINE
Payer: MEDICARE

## 2020-01-01 VITALS
RESPIRATION RATE: 16 BRPM | HEART RATE: 67 BPM | DIASTOLIC BLOOD PRESSURE: 87 MMHG | TEMPERATURE: 99 F | SYSTOLIC BLOOD PRESSURE: 144 MMHG | BODY MASS INDEX: 23.92 KG/M2 | OXYGEN SATURATION: 96 % | WEIGHT: 135 LBS | HEIGHT: 63 IN

## 2020-01-01 VITALS
HEART RATE: 75 BPM | WEIGHT: 140 LBS | OXYGEN SATURATION: 96 % | HEIGHT: 63 IN | RESPIRATION RATE: 20 BRPM | SYSTOLIC BLOOD PRESSURE: 140 MMHG | BODY MASS INDEX: 24.8 KG/M2 | DIASTOLIC BLOOD PRESSURE: 79 MMHG | TEMPERATURE: 99 F

## 2020-01-01 VITALS
SYSTOLIC BLOOD PRESSURE: 126 MMHG | TEMPERATURE: 98 F | RESPIRATION RATE: 14 BRPM | HEIGHT: 65 IN | HEART RATE: 76 BPM | WEIGHT: 135 LBS | DIASTOLIC BLOOD PRESSURE: 78 MMHG | BODY MASS INDEX: 22.49 KG/M2

## 2020-01-01 VITALS
BODY MASS INDEX: 24.45 KG/M2 | WEIGHT: 138 LBS | SYSTOLIC BLOOD PRESSURE: 100 MMHG | OXYGEN SATURATION: 95 % | RESPIRATION RATE: 16 BRPM | HEART RATE: 76 BPM | TEMPERATURE: 99 F | DIASTOLIC BLOOD PRESSURE: 60 MMHG | HEIGHT: 63 IN

## 2020-01-01 VITALS
WEIGHT: 140 LBS | SYSTOLIC BLOOD PRESSURE: 123 MMHG | RESPIRATION RATE: 18 BRPM | BODY MASS INDEX: 23.32 KG/M2 | OXYGEN SATURATION: 99 % | DIASTOLIC BLOOD PRESSURE: 93 MMHG | TEMPERATURE: 99 F | HEIGHT: 65 IN | HEART RATE: 75 BPM

## 2020-01-01 DIAGNOSIS — Z01.818 PRE-OP TESTING: ICD-10-CM

## 2020-01-01 DIAGNOSIS — N40.1 BPH WITH OBSTRUCTION/LOWER URINARY TRACT SYMPTOMS: ICD-10-CM

## 2020-01-01 DIAGNOSIS — R31.0 GROSS HEMATURIA: Primary | ICD-10-CM

## 2020-01-01 DIAGNOSIS — E53.8 B12 DEFICIENCY: ICD-10-CM

## 2020-01-01 DIAGNOSIS — E78.2 MIXED HYPERLIPIDEMIA: Primary | ICD-10-CM

## 2020-01-01 DIAGNOSIS — N40.0 BENIGN PROSTATIC HYPERPLASIA WITHOUT LOWER URINARY TRACT SYMPTOMS: ICD-10-CM

## 2020-01-01 DIAGNOSIS — Z97.8 FOLEY CATHETER IN PLACE: ICD-10-CM

## 2020-01-01 DIAGNOSIS — R31.9 HEMATURIA, UNSPECIFIED TYPE: ICD-10-CM

## 2020-01-01 DIAGNOSIS — N30.01 ACUTE CYSTITIS WITH HEMATURIA: Primary | ICD-10-CM

## 2020-01-01 DIAGNOSIS — R35.0 URINARY FREQUENCY: ICD-10-CM

## 2020-01-01 DIAGNOSIS — I10 ESSENTIAL HYPERTENSION: ICD-10-CM

## 2020-01-01 DIAGNOSIS — N13.8 BPH WITH OBSTRUCTION/LOWER URINARY TRACT SYMPTOMS: ICD-10-CM

## 2020-01-01 DIAGNOSIS — E78.2 MIXED HYPERLIPIDEMIA: ICD-10-CM

## 2020-01-01 DIAGNOSIS — R33.8 URINARY RETENTION DUE TO BENIGN PROSTATIC HYPERPLASIA: Primary | ICD-10-CM

## 2020-01-01 DIAGNOSIS — Z01.818 PRE-OP TESTING: Primary | ICD-10-CM

## 2020-01-01 DIAGNOSIS — Z01.818 PREOPERATIVE CLEARANCE: Primary | ICD-10-CM

## 2020-01-01 DIAGNOSIS — N40.1 URINARY RETENTION DUE TO BENIGN PROSTATIC HYPERPLASIA: Primary | ICD-10-CM

## 2020-01-01 DIAGNOSIS — R33.9 URINARY RETENTION: ICD-10-CM

## 2020-01-01 LAB
ALBUMIN SERPL-MCNC: 3.3 G/DL (ref 3.4–5)
ALBUMIN SERPL-MCNC: 3.4 G/DL (ref 3.4–5)
ALBUMIN SERPL-MCNC: 3.7 G/DL (ref 3.4–5)
ALBUMIN/GLOB SERPL: 0.9 {RATIO} (ref 1–2)
ALBUMIN/GLOB SERPL: 1.1 {RATIO} (ref 1–2)
ALBUMIN/GLOB SERPL: 1.1 {RATIO} (ref 1–2)
ALP LIVER SERPL-CCNC: 87 U/L (ref 45–117)
ALP LIVER SERPL-CCNC: 96 U/L (ref 45–117)
ALP LIVER SERPL-CCNC: 99 U/L (ref 45–117)
ALT SERPL-CCNC: 12 U/L (ref 16–61)
ALT SERPL-CCNC: 15 U/L (ref 16–61)
ALT SERPL-CCNC: 21 U/L (ref 16–61)
ANION GAP SERPL CALC-SCNC: 5 MMOL/L (ref 0–18)
ANION GAP SERPL CALC-SCNC: 5 MMOL/L (ref 0–18)
ANION GAP SERPL CALC-SCNC: 7 MMOL/L (ref 0–18)
AST SERPL-CCNC: 10 U/L (ref 15–37)
AST SERPL-CCNC: 11 U/L (ref 15–37)
AST SERPL-CCNC: 16 U/L (ref 15–37)
ATRIAL RATE: 75 BPM
BASOPHILS # BLD AUTO: 0.02 X10(3) UL (ref 0–0.2)
BASOPHILS NFR BLD AUTO: 0.3 %
BASOPHILS NFR BLD AUTO: 0.3 %
BASOPHILS NFR BLD AUTO: 0.4 %
BILIRUB SERPL-MCNC: 0.7 MG/DL (ref 0.1–2)
BILIRUB SERPL-MCNC: 0.9 MG/DL (ref 0.1–2)
BILIRUB SERPL-MCNC: 0.9 MG/DL (ref 0.1–2)
BUN BLD-MCNC: 15 MG/DL (ref 7–18)
BUN BLD-MCNC: 16 MG/DL (ref 7–18)
BUN BLD-MCNC: 9 MG/DL (ref 7–18)
BUN/CREAT SERPL: 10.7 (ref 10–20)
BUN/CREAT SERPL: 16.9 (ref 10–20)
BUN/CREAT SERPL: 20.8 (ref 10–20)
CALCIUM BLD-MCNC: 9 MG/DL (ref 8.5–10.1)
CALCIUM BLD-MCNC: 9.1 MG/DL (ref 8.5–10.1)
CALCIUM BLD-MCNC: 9.1 MG/DL (ref 8.5–10.1)
CHLORIDE SERPL-SCNC: 103 MMOL/L (ref 98–112)
CHLORIDE SERPL-SCNC: 105 MMOL/L (ref 98–112)
CHLORIDE SERPL-SCNC: 107 MMOL/L (ref 98–112)
CHOLEST SMN-MCNC: 106 MG/DL (ref ?–200)
CO2 SERPL-SCNC: 26 MMOL/L (ref 21–32)
CO2 SERPL-SCNC: 27 MMOL/L (ref 21–32)
CO2 SERPL-SCNC: 29 MMOL/L (ref 21–32)
CREAT BLD-MCNC: 0.77 MG/DL (ref 0.7–1.3)
CREAT BLD-MCNC: 0.84 MG/DL (ref 0.7–1.3)
CREAT BLD-MCNC: 0.89 MG/DL (ref 0.7–1.3)
DEPRECATED RDW RBC AUTO: 43.4 FL (ref 35.1–46.3)
DEPRECATED RDW RBC AUTO: 43.9 FL (ref 35.1–46.3)
DEPRECATED RDW RBC AUTO: 46.6 FL (ref 35.1–46.3)
EOSINOPHIL # BLD AUTO: 0.03 X10(3) UL (ref 0–0.7)
EOSINOPHIL # BLD AUTO: 0.06 X10(3) UL (ref 0–0.7)
EOSINOPHIL # BLD AUTO: 0.1 X10(3) UL (ref 0–0.7)
EOSINOPHIL NFR BLD AUTO: 0.6 %
EOSINOPHIL NFR BLD AUTO: 0.8 %
EOSINOPHIL NFR BLD AUTO: 1.5 %
ERYTHROCYTE [DISTWIDTH] IN BLOOD BY AUTOMATED COUNT: 12.4 % (ref 11–15)
ERYTHROCYTE [DISTWIDTH] IN BLOOD BY AUTOMATED COUNT: 12.5 % (ref 11–15)
ERYTHROCYTE [DISTWIDTH] IN BLOOD BY AUTOMATED COUNT: 13 % (ref 11–15)
GLOBULIN PLAS-MCNC: 3.2 G/DL (ref 2.8–4.4)
GLOBULIN PLAS-MCNC: 3.4 G/DL (ref 2.8–4.4)
GLOBULIN PLAS-MCNC: 3.6 G/DL (ref 2.8–4.4)
GLUCOSE BLD-MCNC: 120 MG/DL (ref 70–99)
GLUCOSE BLD-MCNC: 123 MG/DL (ref 70–99)
GLUCOSE BLD-MCNC: 96 MG/DL (ref 70–99)
GLUCOSE UR STRIP.AUTO-MCNC: NEGATIVE MG/DL
GLUCOSE UR STRIP.AUTO-MCNC: NEGATIVE MG/DL
HCT VFR BLD AUTO: 39.6 % (ref 39–53)
HCT VFR BLD AUTO: 40.8 % (ref 39–53)
HCT VFR BLD AUTO: 43.5 % (ref 39–53)
HDLC SERPL-MCNC: 51 MG/DL (ref 40–59)
HGB BLD-MCNC: 13.3 G/DL (ref 13–17.5)
HGB BLD-MCNC: 13.9 G/DL (ref 13–17.5)
HGB BLD-MCNC: 14.6 G/DL (ref 13–17.5)
IMM GRANULOCYTES # BLD AUTO: 0.01 X10(3) UL (ref 0–1)
IMM GRANULOCYTES # BLD AUTO: 0.01 X10(3) UL (ref 0–1)
IMM GRANULOCYTES # BLD AUTO: 0.02 X10(3) UL (ref 0–1)
IMM GRANULOCYTES NFR BLD: 0.2 %
IMM GRANULOCYTES NFR BLD: 0.2 %
IMM GRANULOCYTES NFR BLD: 0.3 %
INR BLD: 0.96 (ref 0.9–1.1)
KETONES UR STRIP.AUTO-MCNC: NEGATIVE MG/DL
LDLC SERPL CALC-MCNC: 41 MG/DL (ref ?–100)
LYMPHOCYTES # BLD AUTO: 0.78 X10(3) UL (ref 1–4)
LYMPHOCYTES # BLD AUTO: 1.02 X10(3) UL (ref 1–4)
LYMPHOCYTES # BLD AUTO: 1.09 X10(3) UL (ref 1–4)
LYMPHOCYTES NFR BLD AUTO: 12.8 %
LYMPHOCYTES NFR BLD AUTO: 15.2 %
LYMPHOCYTES NFR BLD AUTO: 16.6 %
M PROTEIN MFR SERPL ELPH: 6.6 G/DL (ref 6.4–8.2)
M PROTEIN MFR SERPL ELPH: 6.9 G/DL (ref 6.4–8.2)
M PROTEIN MFR SERPL ELPH: 7.1 G/DL (ref 6.4–8.2)
MCH RBC QN AUTO: 31.9 PG (ref 26–34)
MCH RBC QN AUTO: 32.3 PG (ref 26–34)
MCH RBC QN AUTO: 32.3 PG (ref 26–34)
MCHC RBC AUTO-ENTMCNC: 33.6 G/DL (ref 31–37)
MCHC RBC AUTO-ENTMCNC: 33.6 G/DL (ref 31–37)
MCHC RBC AUTO-ENTMCNC: 34.1 G/DL (ref 31–37)
MCV RBC AUTO: 94.7 FL (ref 80–100)
MCV RBC AUTO: 95 FL (ref 80–100)
MCV RBC AUTO: 96.2 FL (ref 80–100)
MONOCYTES # BLD AUTO: 0.43 X10(3) UL (ref 0.1–1)
MONOCYTES # BLD AUTO: 0.66 X10(3) UL (ref 0.1–1)
MONOCYTES # BLD AUTO: 0.8 X10(3) UL (ref 0.1–1)
MONOCYTES NFR BLD AUTO: 12.2 %
MONOCYTES NFR BLD AUTO: 8.3 %
MONOCYTES NFR BLD AUTO: 8.4 %
NEUTROPHILS # BLD AUTO: 3.87 X10 (3) UL (ref 1.5–7.7)
NEUTROPHILS # BLD AUTO: 3.87 X10(3) UL (ref 1.5–7.7)
NEUTROPHILS # BLD AUTO: 4.54 X10 (3) UL (ref 1.5–7.7)
NEUTROPHILS # BLD AUTO: 4.54 X10(3) UL (ref 1.5–7.7)
NEUTROPHILS # BLD AUTO: 6.2 X10 (3) UL (ref 1.5–7.7)
NEUTROPHILS # BLD AUTO: 6.2 X10(3) UL (ref 1.5–7.7)
NEUTROPHILS NFR BLD AUTO: 69.2 %
NEUTROPHILS NFR BLD AUTO: 75.2 %
NEUTROPHILS NFR BLD AUTO: 77.5 %
NITRITE UR QL STRIP.AUTO: NEGATIVE
NITRITE UR QL STRIP.AUTO: POSITIVE
NONHDLC SERPL-MCNC: 55 MG/DL (ref ?–130)
OSMOLALITY SERPL CALC.SUM OF ELEC: 282 MOSM/KG (ref 275–295)
OSMOLALITY SERPL CALC.SUM OF ELEC: 289 MOSM/KG (ref 275–295)
OSMOLALITY SERPL CALC.SUM OF ELEC: 290 MOSM/KG (ref 275–295)
P-R INTERVAL: 192 MS
PATIENT FASTING Y/N/NP: YES
PATIENT FASTING Y/N/NP: YES
PH UR STRIP.AUTO: 5.5 [PH] (ref 4.5–8)
PH UR STRIP.AUTO: 7 [PH] (ref 4.5–8)
PLATELET # BLD AUTO: 141 10(3)UL (ref 150–450)
PLATELET # BLD AUTO: 160 10(3)UL (ref 150–450)
PLATELET # BLD AUTO: 193 10(3)UL (ref 150–450)
POTASSIUM SERPL-SCNC: 3.5 MMOL/L (ref 3.5–5.1)
POTASSIUM SERPL-SCNC: 3.5 MMOL/L (ref 3.5–5.1)
POTASSIUM SERPL-SCNC: 3.7 MMOL/L (ref 3.5–5.1)
PROT UR STRIP.AUTO-MCNC: >=300 MG/DL
PROT UR STRIP.AUTO-MCNC: >=300 MG/DL
PSA SERPL DL<=0.01 NG/ML-MCNC: 12.8 SECONDS (ref 12.2–14.4)
Q-T INTERVAL: 390 MS
QRS DURATION: 94 MS
QTC CALCULATION (BEZET): 435 MS
R AXIS: 30 DEGREES
RBC # BLD AUTO: 4.17 X10(6)UL (ref 3.8–5.8)
RBC # BLD AUTO: 4.31 X10(6)UL (ref 3.8–5.8)
RBC # BLD AUTO: 4.52 X10(6)UL (ref 3.8–5.8)
RBC #/AREA URNS AUTO: >10 /HPF
RBC #/AREA URNS AUTO: >10 /HPF
SODIUM SERPL-SCNC: 136 MMOL/L (ref 136–145)
SODIUM SERPL-SCNC: 139 MMOL/L (ref 136–145)
SODIUM SERPL-SCNC: 139 MMOL/L (ref 136–145)
SP GR UR STRIP.AUTO: 1.01 (ref 1–1.03)
SP GR UR STRIP.AUTO: 1.01 (ref 1–1.03)
T AXIS: 20 DEGREES
TRIGL SERPL-MCNC: 71 MG/DL (ref 30–149)
UROBILINOGEN UR STRIP.AUTO-MCNC: 0.2 MG/DL
UROBILINOGEN UR STRIP.AUTO-MCNC: 0.2 MG/DL
VENTRICULAR RATE: 75 BPM
VIT B12 SERPL-MCNC: 870 PG/ML (ref 193–986)
VLDLC SERPL CALC-MCNC: 14 MG/DL (ref 0–30)
WBC # BLD AUTO: 5.1 X10(3) UL (ref 4–11)
WBC # BLD AUTO: 6.6 X10(3) UL (ref 4–11)
WBC # BLD AUTO: 8 X10(3) UL (ref 4–11)

## 2020-01-01 PROCEDURE — 51700 IRRIGATION OF BLADDER: CPT

## 2020-01-01 PROCEDURE — 85025 COMPLETE CBC W/AUTO DIFF WBC: CPT

## 2020-01-01 PROCEDURE — 99284 EMERGENCY DEPT VISIT MOD MDM: CPT

## 2020-01-01 PROCEDURE — 81015 MICROSCOPIC EXAM OF URINE: CPT | Performed by: EMERGENCY MEDICINE

## 2020-01-01 PROCEDURE — 93005 ELECTROCARDIOGRAM TRACING: CPT

## 2020-01-01 PROCEDURE — 36415 COLL VENOUS BLD VENIPUNCTURE: CPT

## 2020-01-01 PROCEDURE — 99204 OFFICE O/P NEW MOD 45 MIN: CPT | Performed by: HOSPITALIST

## 2020-01-01 PROCEDURE — 0VT08ZZ RESECTION OF PROSTATE, VIA NATURAL OR ARTIFICIAL OPENING ENDOSCOPIC: ICD-10-PCS | Performed by: UROLOGY

## 2020-01-01 PROCEDURE — 93010 ELECTROCARDIOGRAM REPORT: CPT | Performed by: INTERNAL MEDICINE

## 2020-01-01 PROCEDURE — 99283 EMERGENCY DEPT VISIT LOW MDM: CPT

## 2020-01-01 PROCEDURE — 85025 COMPLETE CBC W/AUTO DIFF WBC: CPT | Performed by: EMERGENCY MEDICINE

## 2020-01-01 PROCEDURE — 81001 URINALYSIS AUTO W/SCOPE: CPT | Performed by: EMERGENCY MEDICINE

## 2020-01-01 PROCEDURE — 99214 OFFICE O/P EST MOD 30 MIN: CPT | Performed by: FAMILY MEDICINE

## 2020-01-01 PROCEDURE — 80053 COMPREHEN METABOLIC PANEL: CPT

## 2020-01-01 PROCEDURE — 80053 COMPREHEN METABOLIC PANEL: CPT | Performed by: EMERGENCY MEDICINE

## 2020-01-01 PROCEDURE — 82607 VITAMIN B-12: CPT

## 2020-01-01 PROCEDURE — 99213 OFFICE O/P EST LOW 20 MIN: CPT | Performed by: HOSPITALIST

## 2020-01-01 PROCEDURE — 85610 PROTHROMBIN TIME: CPT | Performed by: EMERGENCY MEDICINE

## 2020-01-01 PROCEDURE — 96365 THER/PROPH/DIAG IV INF INIT: CPT

## 2020-01-01 PROCEDURE — 80061 LIPID PANEL: CPT

## 2020-01-01 RX ORDER — MEPERIDINE HYDROCHLORIDE 25 MG/ML
12.5 INJECTION INTRAMUSCULAR; INTRAVENOUS; SUBCUTANEOUS AS NEEDED
Status: DISCONTINUED | OUTPATIENT
Start: 2020-01-01 | End: 2020-01-01 | Stop reason: HOSPADM

## 2020-01-01 RX ORDER — ONDANSETRON 2 MG/ML
4 INJECTION INTRAMUSCULAR; INTRAVENOUS AS NEEDED
Status: DISCONTINUED | OUTPATIENT
Start: 2020-01-01 | End: 2020-01-01 | Stop reason: HOSPADM

## 2020-01-01 RX ORDER — HYDROMORPHONE HYDROCHLORIDE 1 MG/ML
0.4 INJECTION, SOLUTION INTRAMUSCULAR; INTRAVENOUS; SUBCUTANEOUS EVERY 5 MIN PRN
Status: DISCONTINUED | OUTPATIENT
Start: 2020-01-01 | End: 2020-01-01 | Stop reason: HOSPADM

## 2020-01-01 RX ORDER — SULFAMETHOXAZOLE AND TRIMETHOPRIM 800; 160 MG/1; MG/1
1 TABLET ORAL 2 TIMES DAILY
Status: DISCONTINUED | OUTPATIENT
Start: 2020-01-01 | End: 2020-01-01

## 2020-01-01 RX ORDER — ACETAMINOPHEN 500 MG
1000 TABLET ORAL ONCE
Status: DISCONTINUED | OUTPATIENT
Start: 2020-01-01 | End: 2020-01-01 | Stop reason: HOSPADM

## 2020-01-01 RX ORDER — PHENAZOPYRIDINE HYDROCHLORIDE 200 MG/1
200 TABLET, FILM COATED ORAL 3 TIMES DAILY PRN
Status: DISCONTINUED | OUTPATIENT
Start: 2020-01-01 | End: 2020-01-01

## 2020-01-01 RX ORDER — HYDROCODONE BITARTRATE AND ACETAMINOPHEN 5; 325 MG/1; MG/1
2 TABLET ORAL AS NEEDED
Status: DISCONTINUED | OUTPATIENT
Start: 2020-01-01 | End: 2020-01-01 | Stop reason: HOSPADM

## 2020-01-01 RX ORDER — LOSARTAN POTASSIUM 100 MG/1
TABLET ORAL
Qty: 90 TABLET | Refills: 1 | Status: SHIPPED | OUTPATIENT
Start: 2020-01-01

## 2020-01-01 RX ORDER — LIDOCAINE HYDROCHLORIDE 20 MG/ML
5 JELLY TOPICAL ONCE
Status: COMPLETED | OUTPATIENT
Start: 2020-01-01 | End: 2020-01-01

## 2020-01-01 RX ORDER — HYOSCYAMINE SULFATE 0.125 MG
0.12 TABLET,DISINTEGRATING ORAL EVERY 4 HOURS PRN
Status: DISCONTINUED | OUTPATIENT
Start: 2020-01-01 | End: 2020-01-01

## 2020-01-01 RX ORDER — NALOXONE HYDROCHLORIDE 0.4 MG/ML
80 INJECTION, SOLUTION INTRAMUSCULAR; INTRAVENOUS; SUBCUTANEOUS AS NEEDED
Status: DISCONTINUED | OUTPATIENT
Start: 2020-01-01 | End: 2020-01-01 | Stop reason: HOSPADM

## 2020-01-01 RX ORDER — SODIUM CHLORIDE 9 MG/ML
INJECTION, SOLUTION INTRAVENOUS CONTINUOUS
Status: DISCONTINUED | OUTPATIENT
Start: 2020-01-01 | End: 2020-01-01

## 2020-01-01 RX ORDER — LIDOCAINE HYDROCHLORIDE 20 MG/ML
JELLY TOPICAL
Status: COMPLETED
Start: 2020-01-01 | End: 2020-01-01

## 2020-01-01 RX ORDER — LEVOFLOXACIN 5 MG/ML
500 INJECTION, SOLUTION INTRAVENOUS ONCE
Status: COMPLETED | OUTPATIENT
Start: 2020-01-01 | End: 2020-01-01

## 2020-01-01 RX ORDER — LIDOCAINE HYDROCHLORIDE 20 MG/ML
JELLY TOPICAL
Status: DISCONTINUED
Start: 2020-01-01 | End: 2020-01-01

## 2020-01-01 RX ORDER — ONDANSETRON 2 MG/ML
INJECTION INTRAMUSCULAR; INTRAVENOUS AS NEEDED
Status: DISCONTINUED | OUTPATIENT
Start: 2020-01-01 | End: 2020-01-01 | Stop reason: SURG

## 2020-01-01 RX ORDER — MAGNESIUM HYDROXIDE 1200 MG/15ML
3000 LIQUID ORAL CONTINUOUS
Status: CANCELLED | OUTPATIENT
Start: 2020-01-01

## 2020-01-01 RX ORDER — ONDANSETRON 4 MG/1
4 TABLET, ORALLY DISINTEGRATING ORAL EVERY 6 HOURS PRN
Status: DISCONTINUED | OUTPATIENT
Start: 2020-01-01 | End: 2020-01-01

## 2020-01-01 RX ORDER — LIDOCAINE HYDROCHLORIDE 10 MG/ML
INJECTION, SOLUTION EPIDURAL; INFILTRATION; INTRACAUDAL; PERINEURAL AS NEEDED
Status: DISCONTINUED | OUTPATIENT
Start: 2020-01-01 | End: 2020-01-01 | Stop reason: SURG

## 2020-01-01 RX ORDER — TRAZODONE HYDROCHLORIDE 50 MG/1
50 TABLET ORAL NIGHTLY
Status: DISCONTINUED | OUTPATIENT
Start: 2020-01-01 | End: 2020-01-01

## 2020-01-01 RX ORDER — LEVOFLOXACIN 500 MG/1
500 TABLET, FILM COATED ORAL DAILY
Qty: 10 TABLET | Refills: 0 | Status: SHIPPED | OUTPATIENT
Start: 2020-01-01 | End: 2020-01-01

## 2020-01-01 RX ORDER — DEXAMETHASONE SODIUM PHOSPHATE 4 MG/ML
VIAL (ML) INJECTION AS NEEDED
Status: DISCONTINUED | OUTPATIENT
Start: 2020-01-01 | End: 2020-01-01 | Stop reason: SURG

## 2020-01-01 RX ORDER — ATORVASTATIN CALCIUM 40 MG/1
40 TABLET, FILM COATED ORAL NIGHTLY
Status: DISCONTINUED | OUTPATIENT
Start: 2020-01-01 | End: 2020-01-01

## 2020-01-01 RX ORDER — HYDROMORPHONE HYDROCHLORIDE 1 MG/ML
INJECTION, SOLUTION INTRAMUSCULAR; INTRAVENOUS; SUBCUTANEOUS
Status: COMPLETED
Start: 2020-01-01 | End: 2020-01-01

## 2020-01-01 RX ORDER — ONDANSETRON 2 MG/ML
4 INJECTION INTRAMUSCULAR; INTRAVENOUS EVERY 6 HOURS PRN
Status: DISCONTINUED | OUTPATIENT
Start: 2020-01-01 | End: 2020-01-01

## 2020-01-01 RX ORDER — SODIUM CHLORIDE, SODIUM LACTATE, POTASSIUM CHLORIDE, CALCIUM CHLORIDE 600; 310; 30; 20 MG/100ML; MG/100ML; MG/100ML; MG/100ML
INJECTION, SOLUTION INTRAVENOUS CONTINUOUS
Status: DISCONTINUED | OUTPATIENT
Start: 2020-01-01 | End: 2020-01-01 | Stop reason: HOSPADM

## 2020-01-01 RX ORDER — SODIUM CHLORIDE, SODIUM LACTATE, POTASSIUM CHLORIDE, CALCIUM CHLORIDE 600; 310; 30; 20 MG/100ML; MG/100ML; MG/100ML; MG/100ML
INJECTION, SOLUTION INTRAVENOUS CONTINUOUS
Status: DISCONTINUED | OUTPATIENT
Start: 2020-01-01 | End: 2020-01-01

## 2020-01-01 RX ORDER — LOSARTAN POTASSIUM 50 MG/1
50 TABLET ORAL DAILY
Status: DISCONTINUED | OUTPATIENT
Start: 2020-01-01 | End: 2020-01-01

## 2020-01-01 RX ORDER — DIPHENHYDRAMINE HYDROCHLORIDE 50 MG/ML
12.5 INJECTION INTRAMUSCULAR; INTRAVENOUS AS NEEDED
Status: DISCONTINUED | OUTPATIENT
Start: 2020-01-01 | End: 2020-01-01 | Stop reason: HOSPADM

## 2020-01-01 RX ORDER — HYDROCODONE BITARTRATE AND ACETAMINOPHEN 5; 325 MG/1; MG/1
1 TABLET ORAL AS NEEDED
Status: DISCONTINUED | OUTPATIENT
Start: 2020-01-01 | End: 2020-01-01 | Stop reason: HOSPADM

## 2020-01-01 RX ORDER — TAMSULOSIN HYDROCHLORIDE 0.4 MG/1
0.4 CAPSULE ORAL DAILY
Status: DISCONTINUED | OUTPATIENT
Start: 2020-01-01 | End: 2020-01-01

## 2020-01-01 RX ORDER — ENOXAPARIN SODIUM 100 MG/ML
40 INJECTION SUBCUTANEOUS DAILY
Status: DISCONTINUED | OUTPATIENT
Start: 2020-01-01 | End: 2020-01-01

## 2020-01-01 RX ORDER — CEFAZOLIN SODIUM/WATER 2 G/20 ML
2 SYRINGE (ML) INTRAVENOUS ONCE
Status: COMPLETED | OUTPATIENT
Start: 2020-01-01 | End: 2020-01-01

## 2020-01-01 RX ADMIN — ONDANSETRON 4 MG: 2 INJECTION INTRAMUSCULAR; INTRAVENOUS at 10:24:00

## 2020-01-01 RX ADMIN — DEXAMETHASONE SODIUM PHOSPHATE 4 MG: 4 MG/ML VIAL (ML) INJECTION at 09:54:00

## 2020-01-01 RX ADMIN — LIDOCAINE HYDROCHLORIDE 50 MG: 10 INJECTION, SOLUTION EPIDURAL; INFILTRATION; INTRACAUDAL; PERINEURAL at 09:46:00

## 2020-01-01 RX ADMIN — SODIUM CHLORIDE, SODIUM LACTATE, POTASSIUM CHLORIDE, CALCIUM CHLORIDE: 600; 310; 30; 20 INJECTION, SOLUTION INTRAVENOUS at 10:34:00

## 2020-01-01 RX ADMIN — CEFAZOLIN SODIUM/WATER 2 G: 2 G/20 ML SYRINGE (ML) INTRAVENOUS at 09:50:00

## 2020-01-09 NOTE — PATIENT INSTRUCTIONS
Thank you for choosing Lyly Abraham MD at [place] [address]  To Do: [facility]  1. Considerations in the Preoperative period:   Surgery is a big deal.  Anesthesia and your medicines and medical issues all stress out your body.   Therefore read ab reuptake inhibitors like zoloft, effexor, paxil, prozac, citalopram, remeron etc. For mood should be held 3 weeks before surgery if high risk of bleeding as these may increase risk of bleeding  Most other psychiatric meds like antipsychotic meds, anxiety m done so.  All your results will post on there.  [email or web address]  • You can NOW use thinkingphones to book your appointments with us, or consider using open access scheduling which is through the edward website [email or web address] and type i please call Central Scheduling at [phone]  Please allow our office 5 business days to contact you regarding any testing results.     Refill policies:   Allow 3 business days for refills; controlled substances may take longer and must be picked up from

## 2020-01-09 NOTE — PROGRESS NOTES
Preoperative History and Physical Internal Medicine    CC: Patient presents with:  Pre-Op Exam: cystoscopy/ Dr Anish Rosales 1/20/2020      Bryant Vazquez is 82year old presenting for a preoperative exam.    This patient is having surgery on date: 1/20/20 for History:  Social History    Tobacco Use      Smoking status: Former Smoker      Smokeless tobacco: Never Used    Alcohol use: No    Drug use: No      Family History   Family history unknown:  Yes       Allergies:  No Known Allergies  Current Meds:    Bernabe Patel Tympanic membrane normal.   Left Ear: Tympanic membrane normal.   Mouth/Throat: Oropharynx is clear and moist.   Eyes: Conjunctivae are normal. No scleral icterus. Neck: Neck supple. No thyromegaly present.    Cardiovascular: Normal rate, regular rhythm a 12/18/2019 106    • CO2 12/18/2019 26.0    • Anion Gap 12/18/2019 6    • BUN 12/18/2019 22*   • Creatinine 12/18/2019 0.94    • BUN/CREA Ratio 12/18/2019 23.4*   • Calcium, Total 12/18/2019 8.7    • Calculated Osmolality 12/18/2019 294    • GFR, Non-No Cholesterol, Total 12/11/2019 135    • HDL Cholesterol 12/11/2019 54    • Triglycerides 12/11/2019 95    • LDL Cholesterol 12/11/2019 62    • VLDL 12/11/2019 19    • Non HDL Chol 12/11/2019 81    • FASTING 12/11/2019 Yes    • Vitamin B12 12/11/2019 983 • MCHC 12/11/2019 33.9    • RDW 12/11/2019 13.0    • RDW-SD 12/11/2019 45.6    • Neutrophil Absolute Prel* 12/11/2019 3.60    • Neutrophil Absolute 12/11/2019 3.60    • Lymphocyte Absolute 12/11/2019 1.24    • Monocyte Absolute 12/11/2019 0.48    • Eosin

## 2020-01-09 NOTE — ICD/PM
No change to pacemaker for TURP. (Pacemaker  unkown). Grounding pad on thigh, opposite side of pacemaker if able. Routine outpatient follow up.     Casimiro Almeida, NP

## 2020-01-13 PROBLEM — I65.23 BILATERAL CAROTID ARTERY STENOSIS: Status: ACTIVE | Noted: 2020-01-01

## 2020-01-13 PROBLEM — M67.432 GANGLION CYST OF VOLAR ASPECT OF LEFT WRIST: Status: RESOLVED | Noted: 2019-01-11 | Resolved: 2020-01-01

## 2020-01-13 NOTE — TELEPHONE ENCOUNTER
Patient's daughter states he is having surgery 1/20/20, had the pre-op visit last week, received a phone call from the hospital that he also needs an ekg and they would put the order in for it and we could schedule it, please advise.

## 2020-01-13 NOTE — TELEPHONE ENCOUNTER
See attached phone message FYI. Spoke to daughter, she states Jennifer Hays states EKG needed Pre-op. Pt under Dr. Codi Caal Cardiac care. 1/9/2020 H+P OV Dr. Harvinder Kumar. Entered EKG order, informed daughter, gave CS phone number.  Ruth Bradshaw

## 2020-01-20 PROBLEM — R35.0 URINARY FREQUENCY: Status: ACTIVE | Noted: 2020-01-01

## 2020-01-20 NOTE — PLAN OF CARE
Problem: Patient/Family Goals  Goal: Patient/Family Long Term Goal  Description  Patient's Long Term Goal: to go home    Interventions:  - monitor I+O's  -tolerate diet as ordered by doctor  -have pain controlled on po pain meds prn  -monitor labs  - See

## 2020-01-20 NOTE — ANESTHESIA PREPROCEDURE EVALUATION
PRE-OP EVALUATION    Patient Name: Jesusita Mayen    Pre-op Diagnosis: Urinary frequency [R35.0]  BPH with obstruction/lower urinary tract symptoms [N40.1, N13.8]    Procedure(s):  CYSTOSCOPY, TRANSURETHRAL RESECTION OF PROSTATE    Surgeon(s) and Role: Neuro/Psych    Negative neuro/psych ROS.                           As per Epic:  Patient Active Problem List:     Pacemaker     Arthritis     Essential hypertension     Hyperlipidemia     Carotid stenosis     Benign prostatic hyperplasia w verified and patient meets guidelines. Comment: I explained intrinsic risks of general anesthesia, including nausea, dental damage, sore throat, mouth injury,and hoarseness from airway management.   All questions were answered and understanding was d

## 2020-01-20 NOTE — PROGRESS NOTES
NURSING ADMISSION NOTE      Patient admitted via Cart  Oriented to room. Safety precautions initiated. Bed in low position. Call light in reach. PT ARRIVED TO ROOM FROM PACU AT THIS TIME. A+O BUT OCCASIONALLY CONFUSED ON TIME.  2L 02 PER NC. TELE (P Pt aware that labs have been mailed (address confirmed).

## 2020-01-20 NOTE — OPERATIVE REPORT
[facility]  Brief Op Note    Jessenia Montoya Location: OR   [facility] [id number] MRN [id number]   Admission Date 1/20/2020 Operation Date 1/20/2020   Attending Physician Cisco Naranjo MD Operating Physician Milena Kelly MD     Pre-Operative Raysa Callejas

## 2020-01-20 NOTE — ANESTHESIA PROCEDURE NOTES
Airway  Date/Time: 1/20/2020 9:48 AM  Urgency: elective      General Information and Staff    Patient location during procedure: OR  Anesthesiologist: Ella Colorado MD  Resident/CRNA: Azra Mckay CRNA  Performed: CRNA     Indications and Patient Conditi

## 2020-01-20 NOTE — CONSULTS
[facility]  [address] [place] [address] Patient Status:  Outpatient in a Bed    3/28/1935 MRN [id number]   [facility] 3NW-A Attending Danielle Hurst MD   Hosp Day # 0 PCP Quynh Ivan MD     Reason for consult: Medical fam hx of cad    Allergies: No Known Allergies    Medications:  No current facility-administered medications on file prior to encounter.    ALFUZOSIN HCL ER 10 MG Oral Tablet 24 Hr, TAKE 1 TABLET(10 MG) BY MOUTH EVERY DAY, Disp: 90 tablet, Rfl: 0  VITAMIN B Labs:  No results for input(s): WBC, HGB, MCV, PLT, BAND, INR in the last 168 hours.     Invalid input(s): LYM#, MONO#, BASOS#, EOSIN#    No results for input(s): GLU, BUN, CREATSERUM, GFRAA, GFRNAA, CA, ALB, NA, K, CL, CO2, ALKPHO, AST, ALT, BILT, TP i

## 2020-01-20 NOTE — ANESTHESIA POSTPROCEDURE EVALUATION
[facility] Patient Status:  Hospital Outpatient Surgery   Age/Gender 82year old male MRN [id number]   [facility] SURGERY Attending Ralph Hays MD   Hosp Day # 0 PCP Lyly Abraham MD       Anesthesia Post-o

## 2020-01-20 NOTE — H&P
[address] Patient Status:  Hospital Outpatient Surgery    3/28/1935 MRN [id number]   Location [facility] PRE OP HOLDING Attending Danielle Hurst MD   Hosp Day # 0 PCP Quynh Ivan MD     Histor external ear canals, both ears   Nose:   Nares normal, septum midline, mucosa normal, no drainage    or sinus tenderness   Neck:   Supple, symmetrical, trachea midline, no adenopathy;        thyroid:  No enlargement/tenderness/nodules; no carotid    bruit

## 2020-01-20 NOTE — OPERATIVE REPORT
[facility]    PATIENT'S NAME: Kali Puenteskishaisa   ATTENDING PHYSICIAN: Reina Chau M.D. OPERATING PHYSICIAN: Reina Chau M.D.    PATIENT ACCOUNT#:   [de-identified]    LOCATION:  OR  OR [place] ROOMS 1 EDWP 10  MEDICAL RECORD #:   [id number]       REJI initiated which was light pink to clear. At the conclusion of procedure, the patient awoke without difficulty and was taken to the recovery room without incident. He will be admitted to the floor for CBI and have a voiding trial in next 1 to 2 days.     D

## 2020-01-21 NOTE — PLAN OF CARE
RECEIVED PATIENT RESTING IN BED. CBI PATENT. PINK TINGED OUTPUT NOTED. NO CLOTS NOTED. PATIENT C/O BL ABDOMINAL CRAMPING AND PENILE PAIN. PYRIDIUM GIVEN BY PREVIOUS RN WITHOUT RELIEF. MD DR Raymond Leone NOTIFIED. ORDERS RECEIVED AND CARRIED OUT.   A- PACED

## 2020-01-21 NOTE — PROGRESS NOTES
1/21/2020 ORDER RECEIVED TO DISCONTINUE ROLLE CATHETER AT THIS TIME. ROLLE CATHETER REMOVED BY REJI BONILLA WITHOUT ANY PROBLEMS REPORTED TO WRITER. PT TOLERATED WELL. DTV. PVR TO BE DONE AFTER FIRST VOID. PT AND PT DAUGHTER AWARE AND AGREES.  ALL QUESTIONS ANS

## 2020-01-21 NOTE — PROGRESS NOTES
01/21/20 1010   Clinical Encounter Type   Visited With Patient and family together  (daughter at bedside)   Routine Visit Introduction   Patient's Supportive Strategies/Resources  provided emotional support   Patient Spiritual Encounters   Ren Machuca

## 2020-01-21 NOTE — PROGRESS NOTES
[facility]  Progress Note     Yanely Zavala Patient Status:  Outpatient in a Bed    3/28/1935 MRN [id number]   [facility] 3NW-A Attending Dana Fisher MD   Hosp Day # 0 PCP Abdias Hughes MD     Chief Complaint: s/p tur HCl  50 mg Oral Nightly       ASSESSMENT / PLAN:     1. S/p turp  -still with hematuria after bladder irigations  -frequency  2. HTN on Losartan  3.carotid artery stenosis resume ASA when ok with urology  SSS s/p PPM  Hypok replace    Plan of care: monitor

## 2020-01-21 NOTE — PROGRESS NOTES
[facility]  Urology Progress Note    Farhat Johnson Patient Status:  Outpatient in a Bed    3/28/1935 MRN [id number]   [facility] 3NW-A Attending David Schafer MD   Hosp Day # 0 PCP Regina Golden MD     Subjective:  Constanza Jacobs

## 2020-01-23 PROBLEM — I49.5 SSS (SICK SINUS SYNDROME) (HCC): Status: RESOLVED | Noted: 2018-07-26 | Resolved: 2020-01-01

## 2020-01-23 PROBLEM — R35.0 URINARY FREQUENCY: Status: RESOLVED | Noted: 2020-01-01 | Resolved: 2020-01-01

## 2020-01-23 NOTE — ED NOTES
3 way warren placed by RN, bloody urine returned about 250cc. CBI inititiated about 150cc instilled urine light pink.  CBI stopped, urine still draining

## 2020-01-23 NOTE — ED PROVIDER NOTES
Patient Seen in: [facility] Emergency Department In [place]      History   Patient presents with:  Urinary Symptoms  Postop/Procedure Problem    Stated Complaint: S/P PROSTATE SX - URINARY RETENTION     HPI    Patient is 53-year-old gentleman here with he HPI.  Constitutional and vital signs reviewed. All other systems reviewed and negative except as noted above. Physical Exam     ED Triage Vitals   BP 01/22/20 2131 (!) 149/92   Pulse 01/22/20 2131 80   Resp 01/22/20 2131 18   Temp 01/22/20 2131 98. within normal limits   COMP METABOLIC PANEL (14) - Abnormal; Notable for the following components:    Glucose 123 (*)     ALT 15 (*)     All other components within normal limits   URINE MICROSCOPIC W REFLEX CULTURE - Abnormal; Notable for the following co PLATELET.   Procedure                               Abnormality         Status                     ---------                               -----------         ------                     CBC W/ DIFFERENTIAL[[id number]]          Abnormal            Final resul

## 2020-01-23 NOTE — PROGRESS NOTES
HPI:    Patient ID: Farhat Johnson is a 82year old male. HPI  Mr. Evon Vallejo is a pleasant 53-year-old gentleman with history of hypertension, hyperlipidemia, coronary artery disease, osteoarthritis, sinus node dysfunction status post pacemaker who rece Allergies:No Known Allergies   PHYSICAL EXAM:   Physical Exam   Constitutional: No distress. HENT:   Mouth/Throat: Oropharynx is clear and moist. No oropharyngeal exudate. Eyes: Conjunctivae are normal. No scleral icterus. Neck: Neck supple.  No t

## 2020-01-23 NOTE — PATIENT INSTRUCTIONS
Thank you for choosing Patrice Lopez MD at [place] [address]  To Do: Megan  1. Please take meds as directed. [facility] is located in [address]. Monday, Tuesday & Friday – 8 a.m. to 4 p.m.   Wednesday, Thursday – 7 a.m. to 3 p outweigh those potential risks and we strive to make you healthier and to improve your quality of life.     Referrals, and Radiology Information:    If your insurance requires a referral to a specialist, please allow 5 business days to process your referral

## 2020-01-23 NOTE — ED NOTES
3 way warren CBI entrance clamped warren continues to drain clear pink urine. RN explained to patient and his daughters what to look for in UO and for any complications, they verbalized understanding. Daughters declined leg bag being placed.  DC instruction

## 2020-01-25 NOTE — ED NOTES
Attempted to irrigate catheter, but unable to obtain more than what was irrigated in.  No blood clots

## 2020-01-25 NOTE — ED PROVIDER NOTES
Patient Seen in: [facility] Emergency Department In [place]      History   Patient presents with:  Urinary Symptoms    Stated Complaint: Jesus Barrera    HPI    82-year-old male who is status post TURP 1/20/2020 presents emergency room for evaluation of d and vital signs reviewed. All other systems reviewed and negative except as noted above.     Physical Exam     ED Triage Vitals [01/25/20 0631]   BP (!) 159/96   Pulse 74   Resp 16   Temp 99.2 °F (37.3 °C)   Temp src Temporal   SpO2 95 %   O2 Device No Abnormality         Status                     ---------                               -----------         ------                     CBC W/ DIFFERENTIAL[[id number]]                              Final result                 Please view results days          Medications Prescribed:  Current Discharge Medication List    START taking these medications    levofloxacin 500 MG Oral Tab  Take 1 tablet (500 mg total) by mouth daily for 10 days.   Qty: 10 tablet Refills: 0

## 2020-01-25 NOTE — ED INITIAL ASSESSMENT (HPI)
Catheter placed Wednesday after inability tp void s/p prostate surgery.  Now has minimal output and abd pain

## 2020-12-16 NOTE — TELEPHONE ENCOUNTER
Requesting : ALFUZOSIN HCL ER 10 MG  LOV: 3/28/19  RTC: 3 months  Last Relevant Labs: 3/7/19  Filled: 2/15/19 # 90 with 0 refills    Future Appointments   Date Time Provider Ross Wynn   5/29/2019  3:40 PM Luisana Cheema MD [id number] CARD [facility] HR Soheila NP

## 2022-07-13 NOTE — PROGRESS NOTES
Dawood Bee is a 82year old male. HPI:   Patient presents with symptoms of UTI. Complaining of urinary frequency, urgency, dysuria, suprapubic pain, chills. Denies hematuria. Denies back pain, fever hematuria. These symptoms have been occurring for Jessica, I am forwarding this back to you because I don't think you completed it   Thanks, Neena °F (36.7 °C) (Temporal)   Resp 16   Ht 64\"   Wt 139 lb   SpO2 99%   BMI 23.86 kg/m²   GENERAL: well developed, well nourished,in no apparent distress  LUNGS: CTAB  HEART: RRR no murmurs   SKIN: no rashes,no suspicious lesions  GI: good BS's,no masses, HSM

## (undated) DEVICE — SUTURE ETHILON 4-0 PS-2

## (undated) DEVICE — SPLINT PRECUT ORTHOGLASS 3X12

## (undated) DEVICE — KENDALL SCD EXPRESS SLEEVES, KNEE LENGTH, MEDIUM: Brand: KENDALL SCD

## (undated) DEVICE — ZIMMER® STERILE DISPOSABLE TOURNIQUET CUFF WITH PLC, DUAL PORT, DUAL BLADDER, 12 IN. (30 CM)

## (undated) DEVICE — GAMMEX® PI HYBRID SIZE 7.5, STERILE POWDER-FREE SURGICAL GLOVE, POLYISOPRENE AND NEOPRENE BLEND: Brand: GAMMEX

## (undated) DEVICE — CYSTO CDS-LF: Brand: MEDLINE INDUSTRIES, INC.

## (undated) DEVICE — DISPOSABLE BIPOLAR FORCEPS 4" (10.2CM) JEWELERS, STRAIGHT 0.4MM TIP AND 12 FT. (3.6M) CABLE: Brand: KIRWAN

## (undated) DEVICE — REM POLYHESIVE ADULT PATIENT RETURN ELECTRODE: Brand: VALLEYLAB

## (undated) DEVICE — PADDING CAST COTTON  4

## (undated) DEVICE — SYRINGE 30ML LL TIP

## (undated) DEVICE — PADDING CAST COTTON STER 3

## (undated) DEVICE — HF-RESECTION ELECTRODE PLASMALOOP LOOP, LARGE, 24 FR., 12°/16°, ESG TURIS: Brand: OLYMPUS

## (undated) DEVICE — URINE DRAINAGE BAG,BAG, NEEDLE SAMPLING, DRAIN TUBE: Brand: DOVER

## (undated) DEVICE — SOL  .9 3000ML

## (undated) DEVICE — GOWN,SIRUS,FABRIC-REINFORCED,X-LARGE: Brand: MEDLINE

## (undated) DEVICE — GLOVE SURG TRIUMPH SZ 71/2

## (undated) DEVICE — UPPER EXTREMITY CDS-LF: Brand: MEDLINE INDUSTRIES, INC.

## (undated) DEVICE — GLOVE SURG TRIUMPH SZ 8

## (undated) DEVICE — SOL  .9 1000ML BTL

## (undated) DEVICE — CONVERTORS STOCKINETTE: Brand: CONVERTORS

## (undated) DEVICE — SOL H2O 1000ML BTL

## (undated) DEVICE — Device

## (undated) DEVICE — PLASTC TOOMEY SYRNG DISP

## (undated) NOTE — ED AVS SNAPSHOT
Alan [place]   MRN: [id number]    Department:  [facility] Emergency Department in [place]   Date of Visit:  12/18/2019           Disclosure     Insurance plans vary and the physician(s) referred by the ER may not be covered by your plan.  Please conta tell this physician (or your personal doctor if your instructions are to return to your personal doctor) about any new or lasting problems. The primary care or specialist physician will see patients referred from the [facility] Emergency Department.  Arthor Bernheim

## (undated) NOTE — ED AVS SNAPSHOT
Bhargav Crowley   MRN: [id number]    Department:  [facility] Emergency Department in [place]   Date of Visit:  1/25/2020           Disclosure     Insurance plans vary and the physician(s) referred by the ER may not be covered by your plan.  Please cont tell this physician (or your personal doctor if your instructions are to return to your personal doctor) about any new or lasting problems. The primary care or specialist physician will see patients referred from the [facility] Emergency Department.  Kang Beltran

## (undated) NOTE — ED AVS SNAPSHOT
Bakari Mathis   MRN: [id number]    Department:  THE [facility] Emergency Department in [place]   Date of Visit:  12/13/2019           Disclosure     Insurance plans vary and the physician(s) referred by the ER may not be covered by your plan.  Please conta tell this physician (or your personal doctor if your instructions are to return to your personal doctor) about any new or lasting problems. The primary care or specialist physician will see patients referred from the [facility] Emergency Department.  Vonda Haynes

## (undated) NOTE — ED AVS SNAPSHOT
Gerald Judy   MRN: [id number]    Department:  [address] [facility] Emergency Department in [place]   Date of Visit:  8/8/2019           Disclosure     Insurance plans vary and the physician(s) referred by the ER may not be covered by your plan.  Please contact tell this physician (or your personal doctor if your instructions are to return to your personal doctor) about any new or lasting problems. The primary care or specialist physician will see patients referred from the [facility] Emergency Department.  Gretta Acuna

## (undated) NOTE — ED AVS SNAPSHOT
Gloria Bar   MRN: [id number]    Department:  [facility] Emergency Department in [place]   Date of Visit:  12/16/2018           Disclosure     Insurance plans vary and the physician(s) referred by the ER may not be covered by your plan.  Please conta tell this physician (or your personal doctor if your instructions are to return to your personal doctor) about any new or lasting problems. The primary care or specialist physician will see patients referred from the [facility] Emergency Department.  Awa Deras

## (undated) NOTE — ED AVS SNAPSHOT
Kelly Rosenthal   MRN: [id number]    Department:  [facility] Emergency Department in [place]   Date of Visit:  1/22/2020           Disclosure     Insurance plans vary and the physician(s) referred by the ER may not be covered by your plan.  Please cont tell this physician (or your personal doctor if your instructions are to return to your personal doctor) about any new or lasting problems. The primary care or specialist physician will see patients referred from the [facility] Emergency Department.  Chetan Chau

## (undated) NOTE — LETTER
[facility]  Phone: [phone]  Right Fax: [phone]  [facility] 20 By: Colleen Ureña Date: 20    Patient Name: Dayne Pelayo  Surgery Date: 2020    CSN: [id number]  Medical Record: [id number]   : 3

## (undated) NOTE — LETTER
DECLINATION FORM    [address] [facility] provides interpreters for patients who are deaf, hearing impaired, or have Limited [place] Proficiency in order to ensure thee patients an equal opportunity to benefit from services.  There Patient Name: Lizette Sotelo     : 3/28/1935    Page 1 of 1     Printed: @DATE      Medical Record #: [id number]   Revised (03)

## (undated) NOTE — ED AVS SNAPSHOT
Kyle Moss   MRN: [id number]    Department:  [facility] Emergency Department in [place]   Date of Visit:  5/26/2018           Disclosure     Insurance plans vary and the physician(s) referred by the ER may not be covered by your plan.  Please contac tell this physician (or your personal doctor if your instructions are to return to your personal doctor) about any new or lasting problems. The primary care or specialist physician will see patients referred from the [facility] Emergency Department.  Valarie Mulligan